# Patient Record
Sex: FEMALE | Race: BLACK OR AFRICAN AMERICAN
[De-identification: names, ages, dates, MRNs, and addresses within clinical notes are randomized per-mention and may not be internally consistent; named-entity substitution may affect disease eponyms.]

---

## 2018-05-03 ENCOUNTER — HOSPITAL ENCOUNTER (INPATIENT)
Dept: HOSPITAL 17 - NEPC | Age: 63
LOS: 6 days | Discharge: HOME | DRG: 78 | End: 2018-05-09
Attending: HOSPITALIST | Admitting: HOSPITALIST
Payer: SELF-PAY

## 2018-05-03 VITALS
DIASTOLIC BLOOD PRESSURE: 109 MMHG | SYSTOLIC BLOOD PRESSURE: 223 MMHG | OXYGEN SATURATION: 98 % | RESPIRATION RATE: 18 BRPM | HEART RATE: 82 BPM | TEMPERATURE: 97.6 F

## 2018-05-03 VITALS
OXYGEN SATURATION: 100 % | HEART RATE: 94 BPM | RESPIRATION RATE: 19 BRPM | DIASTOLIC BLOOD PRESSURE: 79 MMHG | SYSTOLIC BLOOD PRESSURE: 155 MMHG

## 2018-05-03 VITALS
OXYGEN SATURATION: 98 % | DIASTOLIC BLOOD PRESSURE: 106 MMHG | RESPIRATION RATE: 16 BRPM | SYSTOLIC BLOOD PRESSURE: 222 MMHG | HEART RATE: 80 BPM

## 2018-05-03 VITALS
HEART RATE: 100 BPM | SYSTOLIC BLOOD PRESSURE: 158 MMHG | RESPIRATION RATE: 16 BRPM | DIASTOLIC BLOOD PRESSURE: 83 MMHG | OXYGEN SATURATION: 97 %

## 2018-05-03 VITALS
HEART RATE: 76 BPM | RESPIRATION RATE: 24 BRPM | TEMPERATURE: 99.3 F | OXYGEN SATURATION: 100 % | DIASTOLIC BLOOD PRESSURE: 65 MMHG | SYSTOLIC BLOOD PRESSURE: 147 MMHG

## 2018-05-03 VITALS
RESPIRATION RATE: 18 BRPM | DIASTOLIC BLOOD PRESSURE: 72 MMHG | SYSTOLIC BLOOD PRESSURE: 160 MMHG | OXYGEN SATURATION: 98 % | HEART RATE: 74 BPM

## 2018-05-03 VITALS
HEART RATE: 90 BPM | DIASTOLIC BLOOD PRESSURE: 81 MMHG | RESPIRATION RATE: 16 BRPM | OXYGEN SATURATION: 97 % | SYSTOLIC BLOOD PRESSURE: 167 MMHG

## 2018-05-03 VITALS
SYSTOLIC BLOOD PRESSURE: 181 MMHG | RESPIRATION RATE: 15 BRPM | OXYGEN SATURATION: 99 % | HEART RATE: 98 BPM | DIASTOLIC BLOOD PRESSURE: 80 MMHG

## 2018-05-03 VITALS
TEMPERATURE: 99 F | OXYGEN SATURATION: 99 % | SYSTOLIC BLOOD PRESSURE: 154 MMHG | RESPIRATION RATE: 20 BRPM | DIASTOLIC BLOOD PRESSURE: 77 MMHG | HEART RATE: 91 BPM

## 2018-05-03 VITALS
RESPIRATION RATE: 16 BRPM | HEART RATE: 76 BPM | OXYGEN SATURATION: 98 % | SYSTOLIC BLOOD PRESSURE: 212 MMHG | DIASTOLIC BLOOD PRESSURE: 110 MMHG

## 2018-05-03 VITALS — OXYGEN SATURATION: 99 % | SYSTOLIC BLOOD PRESSURE: 209 MMHG | DIASTOLIC BLOOD PRESSURE: 101 MMHG

## 2018-05-03 VITALS
OXYGEN SATURATION: 98 % | HEART RATE: 86 BPM | SYSTOLIC BLOOD PRESSURE: 174 MMHG | DIASTOLIC BLOOD PRESSURE: 85 MMHG | RESPIRATION RATE: 16 BRPM

## 2018-05-03 VITALS
OXYGEN SATURATION: 98 % | RESPIRATION RATE: 16 BRPM | HEART RATE: 86 BPM | DIASTOLIC BLOOD PRESSURE: 83 MMHG | SYSTOLIC BLOOD PRESSURE: 164 MMHG

## 2018-05-03 VITALS
HEART RATE: 85 BPM | RESPIRATION RATE: 16 BRPM | DIASTOLIC BLOOD PRESSURE: 78 MMHG | SYSTOLIC BLOOD PRESSURE: 172 MMHG | OXYGEN SATURATION: 97 %

## 2018-05-03 VITALS
DIASTOLIC BLOOD PRESSURE: 78 MMHG | SYSTOLIC BLOOD PRESSURE: 172 MMHG | HEART RATE: 88 BPM | OXYGEN SATURATION: 97 % | RESPIRATION RATE: 16 BRPM

## 2018-05-03 VITALS — SYSTOLIC BLOOD PRESSURE: 221 MMHG | DIASTOLIC BLOOD PRESSURE: 105 MMHG | OXYGEN SATURATION: 99 %

## 2018-05-03 VITALS
HEART RATE: 83 BPM | DIASTOLIC BLOOD PRESSURE: 65 MMHG | OXYGEN SATURATION: 99 % | SYSTOLIC BLOOD PRESSURE: 150 MMHG | RESPIRATION RATE: 19 BRPM

## 2018-05-03 VITALS
RESPIRATION RATE: 21 BRPM | OXYGEN SATURATION: 99 % | HEART RATE: 73 BPM | SYSTOLIC BLOOD PRESSURE: 156 MMHG | DIASTOLIC BLOOD PRESSURE: 70 MMHG

## 2018-05-03 VITALS — WEIGHT: 220.68 LBS | BODY MASS INDEX: 44.49 KG/M2 | HEIGHT: 59 IN

## 2018-05-03 VITALS
DIASTOLIC BLOOD PRESSURE: 70 MMHG | HEART RATE: 78 BPM | SYSTOLIC BLOOD PRESSURE: 151 MMHG | OXYGEN SATURATION: 97 % | RESPIRATION RATE: 20 BRPM

## 2018-05-03 VITALS — OXYGEN SATURATION: 98 %

## 2018-05-03 VITALS
HEART RATE: 84 BPM | DIASTOLIC BLOOD PRESSURE: 95 MMHG | SYSTOLIC BLOOD PRESSURE: 202 MMHG | RESPIRATION RATE: 16 BRPM | OXYGEN SATURATION: 98 %

## 2018-05-03 VITALS — OXYGEN SATURATION: 99 %

## 2018-05-03 DIAGNOSIS — I16.0: ICD-10-CM

## 2018-05-03 DIAGNOSIS — R11.0: ICD-10-CM

## 2018-05-03 DIAGNOSIS — I10: ICD-10-CM

## 2018-05-03 DIAGNOSIS — R47.01: ICD-10-CM

## 2018-05-03 DIAGNOSIS — I67.4: Primary | ICD-10-CM

## 2018-05-03 DIAGNOSIS — K64.8: ICD-10-CM

## 2018-05-03 DIAGNOSIS — R30.0: ICD-10-CM

## 2018-05-03 DIAGNOSIS — G45.9: ICD-10-CM

## 2018-05-03 DIAGNOSIS — J45.909: ICD-10-CM

## 2018-05-03 DIAGNOSIS — K51.90: ICD-10-CM

## 2018-05-03 DIAGNOSIS — K64.4: ICD-10-CM

## 2018-05-03 LAB
ALBUMIN SERPL-MCNC: 3.1 GM/DL (ref 3.4–5)
ALP SERPL-CCNC: 72 U/L (ref 45–117)
ALT SERPL-CCNC: 10 U/L (ref 10–53)
AST SERPL-CCNC: 13 U/L (ref 15–37)
BASOPHILS # BLD AUTO: 0 TH/MM3 (ref 0–0.2)
BASOPHILS NFR BLD: 0.6 % (ref 0–2)
BILIRUB SERPL-MCNC: 0.3 MG/DL (ref 0.2–1)
BUN SERPL-MCNC: 8 MG/DL (ref 7–18)
CALCIUM SERPL-MCNC: 8.7 MG/DL (ref 8.5–10.1)
CHLORIDE SERPL-SCNC: 108 MEQ/L (ref 98–107)
CREAT SERPL-MCNC: 1.08 MG/DL (ref 0.5–1)
EOSINOPHIL # BLD: 0.2 TH/MM3 (ref 0–0.4)
EOSINOPHIL NFR BLD: 2.3 % (ref 0–4)
ERYTHROCYTE [DISTWIDTH] IN BLOOD BY AUTOMATED COUNT: 16.6 % (ref 11.6–17.2)
GFR SERPLBLD BASED ON 1.73 SQ M-ARVRAT: 62 ML/MIN (ref 89–?)
GLUCOSE SERPL-MCNC: 117 MG/DL (ref 74–106)
HBA1C MFR BLD: 5.4 % (ref 4.3–6)
HCO3 BLD-SCNC: 26.5 MEQ/L (ref 21–32)
HCT VFR BLD CALC: 39 % (ref 35–46)
HGB BLD-MCNC: 12.8 GM/DL (ref 11.6–15.3)
LYMPHOCYTES # BLD AUTO: 2.1 TH/MM3 (ref 1–4.8)
LYMPHOCYTES NFR BLD AUTO: 31 % (ref 9–44)
MCH RBC QN AUTO: 27.9 PG (ref 27–34)
MCHC RBC AUTO-ENTMCNC: 32.9 % (ref 32–36)
MCV RBC AUTO: 84.9 FL (ref 80–100)
MONOCYTE #: 0.7 TH/MM3 (ref 0–0.9)
MONOCYTES NFR BLD: 10.2 % (ref 0–8)
NEUTROPHILS # BLD AUTO: 3.7 TH/MM3 (ref 1.8–7.7)
NEUTROPHILS NFR BLD AUTO: 55.9 % (ref 16–70)
PLATELET # BLD: 422 TH/MM3 (ref 150–450)
PMV BLD AUTO: 7.4 FL (ref 7–11)
PROT SERPL-MCNC: 8.2 GM/DL (ref 6.4–8.2)
RBC # BLD AUTO: 4.6 MIL/MM3 (ref 4–5.3)
SODIUM SERPL-SCNC: 142 MEQ/L (ref 136–145)
TROPONIN I SERPL-MCNC: (no result) NG/ML (ref 0.02–0.05)
WBC # BLD AUTO: 6.6 TH/MM3 (ref 4–11)

## 2018-05-03 PROCEDURE — 93306 TTE W/DOPPLER COMPLETE: CPT

## 2018-05-03 PROCEDURE — 94150 VITAL CAPACITY TEST: CPT

## 2018-05-03 PROCEDURE — 84484 ASSAY OF TROPONIN QUANT: CPT

## 2018-05-03 PROCEDURE — 87506 IADNA-DNA/RNA PROBE TQ 6-11: CPT

## 2018-05-03 PROCEDURE — 87205 SMEAR GRAM STAIN: CPT

## 2018-05-03 PROCEDURE — 81001 URINALYSIS AUTO W/SCOPE: CPT

## 2018-05-03 PROCEDURE — 93880 EXTRACRANIAL BILAT STUDY: CPT

## 2018-05-03 PROCEDURE — 96374 THER/PROPH/DIAG INJ IV PUSH: CPT

## 2018-05-03 PROCEDURE — 87102 FUNGUS ISOLATION CULTURE: CPT

## 2018-05-03 PROCEDURE — 87015 SPECIMEN INFECT AGNT CONCNTJ: CPT

## 2018-05-03 PROCEDURE — 70551 MRI BRAIN STEM W/O DYE: CPT

## 2018-05-03 PROCEDURE — 80053 COMPREHEN METABOLIC PANEL: CPT

## 2018-05-03 PROCEDURE — 87328 CRYPTOSPORIDIUM AG IA: CPT

## 2018-05-03 PROCEDURE — 85025 COMPLETE CBC W/AUTO DIFF WBC: CPT

## 2018-05-03 PROCEDURE — 86703 HIV-1/HIV-2 1 RESULT ANTBDY: CPT

## 2018-05-03 PROCEDURE — 82948 REAGENT STRIP/BLOOD GLUCOSE: CPT

## 2018-05-03 PROCEDURE — 87329 GIARDIA AG IA: CPT

## 2018-05-03 PROCEDURE — 93005 ELECTROCARDIOGRAM TRACING: CPT

## 2018-05-03 PROCEDURE — 76937 US GUIDE VASCULAR ACCESS: CPT

## 2018-05-03 PROCEDURE — 84132 ASSAY OF SERUM POTASSIUM: CPT

## 2018-05-03 PROCEDURE — 84100 ASSAY OF PHOSPHORUS: CPT

## 2018-05-03 PROCEDURE — 87206 SMEAR FLUORESCENT/ACID STAI: CPT

## 2018-05-03 PROCEDURE — 87641 MR-STAPH DNA AMP PROBE: CPT

## 2018-05-03 PROCEDURE — 70544 MR ANGIOGRAPHY HEAD W/O DYE: CPT

## 2018-05-03 PROCEDURE — 80061 LIPID PANEL: CPT

## 2018-05-03 PROCEDURE — 88305 TISSUE EXAM BY PATHOLOGIST: CPT

## 2018-05-03 PROCEDURE — 83036 HEMOGLOBIN GLYCOSYLATED A1C: CPT

## 2018-05-03 PROCEDURE — 70450 CT HEAD/BRAIN W/O DYE: CPT

## 2018-05-03 PROCEDURE — 80048 BASIC METABOLIC PNL TOTAL CA: CPT

## 2018-05-03 PROCEDURE — 82272 OCCULT BLD FECES 1-3 TESTS: CPT

## 2018-05-03 RX ADMIN — INSULIN ASPART SCH: 100 INJECTION, SOLUTION INTRAVENOUS; SUBCUTANEOUS at 12:00

## 2018-05-03 RX ADMIN — Medication SCH ML: at 21:52

## 2018-05-03 RX ADMIN — STANDARDIZED SENNA CONCENTRATE AND DOCUSATE SODIUM SCH TAB: 8.6; 5 TABLET, FILM COATED ORAL at 21:00

## 2018-05-03 RX ADMIN — NICARDIPINE HYDROCHLORIDE PRN MLS/HR: 2.5 INJECTION INTRAVENOUS at 21:53

## 2018-05-03 RX ADMIN — DILTIAZEM HYDROCHLORIDE SCH MG: 60 TABLET, FILM COATED ORAL at 17:03

## 2018-05-03 RX ADMIN — HEPARIN SODIUM SCH UNITS: 10000 INJECTION, SOLUTION INTRAVENOUS; SUBCUTANEOUS at 14:20

## 2018-05-03 RX ADMIN — DILTIAZEM HYDROCHLORIDE SCH MG: 60 TABLET, FILM COATED ORAL at 23:29

## 2018-05-03 RX ADMIN — ASPIRIN SCH MG: 325 TABLET ORAL at 12:00

## 2018-05-03 RX ADMIN — INSULIN ASPART SCH: 100 INJECTION, SOLUTION INTRAVENOUS; SUBCUTANEOUS at 21:00

## 2018-05-03 RX ADMIN — PHENYTOIN SODIUM SCH MLS/HR: 50 INJECTION INTRAMUSCULAR; INTRAVENOUS at 19:00

## 2018-05-03 RX ADMIN — FAMOTIDINE SCH MG: 10 INJECTION, SOLUTION INTRAVENOUS at 21:52

## 2018-05-03 RX ADMIN — INSULIN ASPART SCH: 100 INJECTION, SOLUTION INTRAVENOUS; SUBCUTANEOUS at 17:00

## 2018-05-03 NOTE — HHI.HP
HPI


Service


Critical Care Medicine


Primary Care Physician


Unknown


Admission Diagnosis





hypertensive encephalopathy


Diagnosis:  


Travel History


International Travel<30 Days:  No


Contact w/Intl Traveler <30 Da:  No


Traveled to Known Affected Are:  No


History of Present Illness


History of Present Illness


HPI


This is a 63-year-old female that presented to  the [facility] ED with complaints 

of headache and some confusion.  Per report beginning last evening ,she had 

pain behind her left eye feeling like someone is pushing her eye out, constant, 

moderate severity associated with nausea but no vomiting.  For about an hour 

last evening she says she was confused and her words were not coming out right.

  Her daughter said she had slurred speech and she did not make any sense.  The 

patient's daughter directed her to report to the hospital last night , however 

she was reluctant secondary to lack of insurance.  This morning the patient's 

symptoms worsened, and she came to the ED.  She recently was diagnosed with 

hypertension and she was started on valsartan which she took last night at 6 

PM.  She also took Tylenol throughout the night for her pain.  In the ED , the 

patient was noted to be hypertensive nicardipine infusion was initiated and 

initial CT scan was performed which was negative.  Intermittently during the 

visit the patient began to have expressive aphasia and confusion, and a stroke 

alert was called.  Dr. Dyer evaluated the patient, repeat CT was performed, 

TPA was initially ordered however cancelled as the patient had resolution of 

symptoms returned to baseline.  Critical care medicine was consulted.





 History 


PFSH


Past Medical History


Hx Anticoagulant Therapy:  No


Asthma:  Yes


Diabetes:  No


Diminished Hearing:  No


GERD:  Yes


Hypertension:  Yes


:  2


Para:  2





Past Surgical History


Tonsillectomy:  Yes





Social History


Alcohol Use:  No


Tobacco Use:  No


Substance Use:  No





 Allergies-Medications 


Allergies-Medications


(Allergen,Severity, Reaction):  


Coded Allergies:  


     iodine (Unverified  Allergy, Severe, Hives, 8/15/17)


     potassium iodide (Unverified  Allergy, Severe, Hives, 8/15/17)


     povidone-iodine (Unverified  Allergy, Severe, Hives, 8/15/17)


     sodium iodide (Unverified  Allergy, Severe, Hives, 8/15/17)


     sodium iodide (Unverified  Allergy, Severe, Hives, 8/15/17)


Reported Meds & Prescriptions





Reported Meds & Active Scripts


Active


Erythromycin Opht 0.5% Oint (Erythromycin) 0.5 % Oint 1 Applic RIGHT EYE Q6HR 5 

Days





Review of Systems


ROS


12 point review of systems done with the patient negative except for pertinent 

positives mentioned in the above history and physical.





Physical Exam


Vital Signs





Vital Signs








  Date Time  Temp Pulse Resp B/P (MAP) Pulse Ox O2 Delivery O2 Flow Rate FiO2


 


5/3/18 13:47  90 16 167/81 (109) 97 Nasal Cannula 2.00 


 


5/3/18 13:12  100 16 158/83 (108) 97 Nasal Cannula 2.00 


 


5/3/18 12:08  86 16 164/83 (110) 98 Nasal Cannula 2.00 


 


5/3/18 12:05     98  2.00 


 


5/3/18 11:39  86 16 174/85 (114) 98 Nasal Cannula 2.00 


 


5/3/18 11:30  84 16 202/95 (130) 98 Nasal Cannula 2.00 


 


5/3/18 11:19  76 16 212/110 (144) 98 Nasal Cannula 2.00 


 


5/3/18 11:16  80 16 222/106 (144) 98 Nasal Cannula 2.00 


 


5/3/18 11:05  76 16 221/105 (143) 99 Nasal Cannula 2.00 


 


5/3/18 11:05     98 Nasal Cannula 2.00 


 


5/3/18 10:34  77 16 209/101 (137) 99 Nasal Cannula 2.00 


 


5/3/18 09:50  70 16  98 Room Air  


 


5/3/18 09:24 97.6 82 18 223/109 (147) 98   








Laboratory





Laboratory Tests








Test


  5/3/18


09:58 5/3/18


10:30


 


White Blood Count 6.6  


 


Red Blood Count 4.60  


 


Hemoglobin 12.8  


 


Hematocrit 39.0  


 


Mean Corpuscular Volume 84.9  


 


Mean Corpuscular Hemoglobin 27.9  


 


Mean Corpuscular Hemoglobin


Concent 32.9 


  


 


 


Red Cell Distribution Width 16.6  


 


Platelet Count 422  


 


Mean Platelet Volume 7.4  


 


Neutrophils (%) (Auto) 55.9  


 


Lymphocytes (%) (Auto) 31.0  


 


Monocytes (%) (Auto) 10.2  


 


Eosinophils (%) (Auto) 2.3  


 


Basophils (%) (Auto) 0.6  


 


Neutrophils # (Auto) 3.7  


 


Lymphocytes # (Auto) 2.1  


 


Monocytes # (Auto) 0.7  


 


Eosinophils # (Auto) 0.2  


 


Basophils # (Auto) 0.0  


 


CBC Comment DIFF FINAL  


 


Differential Comment   


 


Blood Urea Nitrogen  8 


 


Creatinine  1.08 


 


Random Glucose  117 


 


Total Protein  8.2 


 


Albumin  3.1 


 


Calcium Level  8.7 


 


Alkaline Phosphatase  72 


 


Aspartate Amino Transf


(AST/SGOT) 


  13 


 


 


Alanine Aminotransferase


(ALT/SGPT) 


  10 


 


 


Total Bilirubin  0.3 


 


Sodium Level  142 


 


Potassium Level  3.1 


 


Chloride Level  108 


 


Carbon Dioxide Level  26.5 


 


Anion Gap  8 


 


Estimat Glomerular Filtration


Rate 


  62 


 


 


Troponin I  LESS THAN 0.02 








Result Diagram:  


5/3/18 0958                                                                    

            5/3/18 1030





Imaging





Last Impressions








Head CT 5/3/18 0947 Signed





Impressions: 





 Service Date/Time:  Thursday, May 3, 2018 10:03 - CONCLUSION:  Negative 





 noncontrast CT brain.     Rosalio Carter MD 


 


Carotid Artery Ultrasound 5/3/18 0000 Signed





Impressions: 





 Service Date/Time:  Thursday, May 3, 2018 12:05 - CONCLUSION: Minimal plaque 





 with no evidence of stenosis.     Antoni Horne MD 











Septic Shock Reassessment


Septic shock perfusion:  reassessment completed





Caprini VTE Risk Assessment


Caprini VTE Risk Assessment:  Mod/High Risk (score >= 2)


Caprini Risk Assessment Model











 Point Value = 1          Point Value = 2  Point Value = 3  Point Value = 5


 


Age 41-60


Minor surgery


BMI > 25 kg/m2


Swollen legs


Varicose veins


Pregnancy or postpartum


History of unexplained or recurrent


   spontaneous 


Oral contraceptives or hormone


   replacement


Sepsis (< 1 month)


Serious lung disease, including


   pneumonia (< 1 month)


Abnormal pulmonary function


Acute myocardial infarction


Congestive heart failure (< 1 month)


History of inflammatory bowel disease


Medical patient at bed rest Age 61-74


Arthroscopic surgery


Major open surgery (> 45 min)


Laparoscopic surgery (> 45 min)


Malignancy


Confined to bed (> 72 hours)


Immobilizing plaster cast


Central venous access Age >= 75


History of VTE


Family history of VTE


Factor V Leiden


Prothrombin 34150I


Lupus anticoagulant


Anticardiolipin antibodies


Elevated serum homocysteine


Heparin-induced thrombocytopenia


Other congenital or acquired


   thrombophilia Stroke (< 1 month)


Elective arthroplasty


Hip, pelvis, or leg fracture


Acute spinal cord injury (< 1 month)








Prophylaxis Regimen











   Total Risk


Factor Score Risk Level Prophylaxis Regimen


 


0-1      Low Early ambulation


 


2 Moderate Order ONE of the following:


*Sequential Compression Device (SCD)


*Heparin 5000 units SQ BID


 


3-4 Higher Order ONE of the following medications:


*Heparin 5000 units SQ TID


*Enoxaparin/Lovenox 40 mg SQ daily (WT < 150 kg, CrCl > 30 mL/min)


*Enoxaparin/Lovenox 30 mg SQ daily (WT < 150 kg, CrCl > 10-29 mL/min)


*Enoxaparin/Lovenox 30 mg SQ BID (WT < 150 kg, CrCl > 30 mL/min)


AND/OR


*Sequential Compression Device (SCD)


 


5 or more Highest Order ONE of the following medications:


*Heparin 5000 units SQ TID (Preferred with Epidurals)


*Enoxaparin/Lovenox 40 mg SQ daily (WT < 150 kg, CrCl > 30 mL/min)


*Enoxaparin/Lovenox 30 mg SQ daily (WT < 150 kg, CrCl > 10-29 mL/min)


*Enoxaparin/Lovenox 30 mg SQ BID (WT < 150 kg, CrCl > 30 mL/min)


AND


*Sequential Compression Device (SCD)











Assessment and Plan


Problem List:  


(1) Hypertensive encephalopathy


ICD Code:  I67.4 - Hypertensive encephalopathy


Status:  Acute


(2) TIA (transient ischemic attack)


ICD Code:  G45.9 - Transient cerebral ischemic attack, unspecified


(3) GERD (gastroesophageal reflux disease)


ICD Code:  K21.9 - Gastro-esophageal reflux disease without esophagitis


(4) Hypertensive urgency


ICD Code:  I16.0 - Hypertensive urgency


Assessment and Plan


Assessment


63-year-old -American female with a history of progressive TIAs per 

report currently with hypertension  on nicardipine infusion. Admit to ICU


Plan by systems: 


Neurologic:


TIA


Hypertensive encephalopathy


Neurology following-Dr. Dyer


Follow-up MRI/MRA brain


Follow-up carotid Doppler study


5/3 Initial CT brain negative


5/3 Repeat CT-1. Stable noncontrast head CT. No acute intracranial abnormality 

is identified. Stable mild periventricular white matter low-attenuation likely 

representing chronic microvascular ischemia. There is also an asymmetric area 

of low density in the right centrum semiovale which may represent an area of 

old ischemia.


Maintain head of bed flat


 mg/day


 





Respiratory:


Maintain O2 saturation greater than 92%


Incentive spirometry


Duo nebs every 4 hours as needed for wheezing





Cardiovascular:


Hypertensive Urgency


Nicardipine 5 mg/hour-continue to wean


Cardizem 60 mg p.o. every 8 hrs


Obtain echo


Hydralazine 20 mg every 4 hours for SBP greater than 160mmHg


Labetalol 10 mg every 4 hours as needed for SBP greater than 180mmHg





Renal:


No De [place] Garza indicated


-- Strict I/Os 





FEN/GI:


Monitor BMP


Obtain lipid panel


Obtain formal swallow study


Normal saline 84 cc/hr


Zofran for nausea 


Famotidine GI prophylaxis





Heme/ID:


Monitor CBC





Endocrine:


Glucose monitoring per ICU protocol


Follow-up hemoglobin A1c


-- SSI 





Prophylaxis:


GI Prophylaxis


Famotidine twice daily


DVT Prophylaxis


-- SCDs


Heparin 5000 SQ BID





Lines:


Peripheral IVs 2.  Central line if indicated


Dispo:


my billing statement 


This patient remains critically ill with one or more organ systems which are or 

may become a threat to life. I have spent in excess of 30 minutes 

discontinuously in the care and management of this patient. This time is 

exclusive of procedures, and includes, but is not limited to, evaluation of the 

patient, review of the medical record, discussions with family, consultants, 

nursing staff, or respiratory therapy, and documentation in the medical record.


Code Status


Full


Discussed Condition With


Daughter MsMatthew Dr. Kraig Downing and ED RN at bedside.











Tomasa Taylor MD May 3, 2018 14:23

## 2018-05-03 NOTE — RADRPT
EXAM DATE/TIME:  05/03/2018 13:13 

 

[facility] COMPARISON:     

CT BRAIN W/O CONTRAST, May 03, 2018, 11:16.

       

 

 

INDICATIONS :     

Altered mental status. CVA.

                     

 

MEDICAL HISTORY :     

Hypertension.     

 

SURGICAL HISTORY :     

None.     

 

ENCOUNTER:     

Subsequent

 

ACUITY:     

1 day

 

PAIN SCORE:     

0/10

 

LOCATION:         

head.

 

TECHNIQUE:     

Multiplanar, multisequence MRI of the brain was performed without contrast.

 

FINDINGS:     

 

CEREBRUM:     

The ventricles are normal for age.  No evidence of midline shift, mass lesion, hemorrhage or acute in
farction.  No extraaxial fluid collections are seen.  The pituitary gland and suprasellar cistern are
 normal in configuration.

 

WHITE MATTER:     

Scattered areas of periventricular T2 prolongation and a focal area of T2 prolongation in the mid bod
y of the corpus callosum characteristic of an old infarction.

 

POSTERIOR FOSSA:     

The cerebellum and brainstem are intact.  The 4th ventricle is midline. The cerebellopontine angle is
 unremarkable.  The cerebellar tonsils are normal in position.

 

DIFFUSION IMAGING:     

No focal areas of restricted diffusion are seen.  No evidence of acute infarction.

 

EXTRACRANIAL:     

The visualized portions of the orbits and paranasal sinuses are unremarkable.  8 mm cyst in the right
 parotid.

 

CONCLUSION:     

1. No acute findings in the brain.

2. Nonspecific areas of T2 prolongation in the periventricular white matter and involving the mid-cor
pus callosum.

 

 

 

 Rosalio Carter MD on May 03, 2018 at 13:59           

Board Certified Radiologist.

 This report was verified electronically.

## 2018-05-03 NOTE — RADRPT
EXAM DATE/TIME:  05/03/2018 11:16 

 

[facility] COMPARISON:     

CT BRAIN W/O CONTRAST, May 03, 2018, 10:03.

 

 

INDICATIONS :     

Stroke alert.Confusion

                      

 

RADIATION DOSE:     

35.87 CTDIvol (mGy) 

 

 

These findings were directly reported to Dr. Cornell by Dr. Rodriguez at 11:

20 AM. 

 

 

MEDICAL HISTORY :     

Hypertension.  

 

SURGICAL HISTORY :      

None. 

 

ENCOUNTER:      

Initial

 

ACUITY:      

1 day

 

PAIN SCALE:      

5/10

 

LOCATION:       

Left  eye

 

TECHNIQUE:     

Multiple contiguous axial images were obtained of the head.  Using automated exposure control and adj
ustment of the mA and/or kV according to patient size, radiation dose was kept as low as reasonably a
chievable to obtain optimal diagnostic quality images.   DICOM format image data is available electro
nically for review and comparison.  

 

FINDINGS:     

 

CEREBRUM:     

The ventricles are normal. There is stable mild periventricular white matter low-attenuation and is s
table area of low density in the right centrum semiovale measuring 11 mm. There is mild basal ganglia
 calcification bilaterally.  No midline shift, mass lesion, hemorrhage or acute infarction.  No extra
-axial fluid collections are seen.

 

POSTERIOR FOSSA:     

The cerebellum and brainstem demonstrate no acute finding.  The 4th ventricle is midline.  The cerebe
llopontine angle is unremarkable.

 

EXTRACRANIAL:     

Visualized sinuses are clear.

 

SKULL:     

The calvaria is intact.  No evidence of skull fracture.

 

CONCLUSION:     

1. Stable noncontrast head CT. No acute intracranial abnormality is identified.

2. Stable mild periventricular white matter low-attenuation likely representing chronic microvascular
 ischemia. There is also an asymmetric area of low density in the right centrum semiovale which may r
epresent an area of old ischemia.

 

 

 

 Joce Rodriguez MD on May 03, 2018 at 11:21           

Board Certified Radiologist.

 This report was verified electronically.

## 2018-05-03 NOTE — RADRPT
EXAM DATE/TIME:  05/03/2018 13:13 

 

[facility] COMPARISON:     

No previous studies available for comparison.

       

 

 

INDICATIONS :     

Altered mental status. CVA.

                     

 

MEDICAL HISTORY :     

Hypertension.     

 

SURGICAL HISTORY :     

None.     

 

ENCOUNTER:     

Subsequent

 

ACUITY:     

1 day

 

PAIN SCORE:     

0/10

 

LOCATION:         

head.

 

Please note a normal MRA of the brain does not entirely exclude the possibility of a small aneurysm, 


nor the possibility of distal intracranial vessel disease.

 

TECHNIQUE:     

3D time of flight MRA was performed.  Source images, multiplanar STS MIP, and 3D volume MIP reconstru
ctions were reviewed.

 

FINDINGS:     

There is excellent visualization of the major intracranial arteries out to the second-order branch ve
ssels.  There is no evidence for aneurysm, vessel truncation or stenosis, and no evidence for vascula
r malformation.  The right posterior cerebral artery arises from the anterior circulation.  Flow is s
een in the left PCOM and in the anterior communicating artery.

 

CONCLUSION:     

No evidence of vessel truncation.

 

 

 

 Rosalio Carter MD on May 03, 2018 at 14:02           

Board Certified Radiologist.

 This report was verified electronically.

## 2018-05-03 NOTE — RADRPT
EXAM DATE/TIME:  05/03/2018 12:05 

 

[facility] COMPARISON:     

No previous studies available for comparison.

        

 

 

INDICATIONS :     

Headache. Confusion.  Pain behind left eye.

                     

 

MEDICAL HISTORY :     

Hypertension. Gastroesophageal reflux disease.   Asthma.

 

SURGICAL HISTORY :     

Tonsillectomy.     

 

ENCOUNTER:     

Initial

 

ACUITY:     

2 days

 

PAIN SCORE:     

3/10

 

LOCATION:     

Bilateral neck 

                     

PEAK SYSTOLIC VELOCITIES (cm/sec):

 

ICA/CCA RATIO:                    

Right: 1.0     Left: 0.5

 

ICA:                          

Right: 106     Left: 63

 

CCA:                          

Right: 106     Left: 140

 

ECA:                           

Right: 87     Left: 74

 

VERTEBRAL:           

Right: 48 antegrade     Left: 68 antegrade

             

Elevated flow velocities and ICA/CCA ratios have been found to correlate with increased degrees of

vessel stenosis, calculated as percentage of diameter relative to a normal segment of distal ICA/CCA

 

FINDINGS:     

 

RIGHT CAROTID:     

No significant stenosis is visualized. Minimal plaque is present. The waveforms are within normal gunter
its.

 

LEFT CAROTID:     

No significant stenosis is visualized.  Minimal plaque is present. The waveforms are within normal li
mits.

 

VERTEBRAL ARTERIES:  

Antegrade flow is seen in both vertebral arteries.

MISCELLANEOUS:     None.

 

CONCLUSION:     Minimal plaque with no evidence of stenosis. 

 

 

 Antoni Horne MD on May 03, 2018 at 13:01           

Board Certified Radiologist.

 This report was verified electronically.

## 2018-05-03 NOTE — MB
cc:

Shahid Dyer MD, PhD

****

 

 

DATE:

05/03/2018

 

REASON FOR CONSULTATION:

Stroke alert.

 

REFERRING PHYSICIAN:

Susi Cornell MD

 

HISTORY OF PRESENT ILLNESS:

Ms. Bonilla is a pleasant 63-year-old female who presented to the ER 

for headache.  While in the ER, developed sudden difficulty getting 

words out with an expressive aphasia.  No focal motor symptoms were 

observed.  Therefore, a stroke alert was called.  She had a similar 

episode last night.  Her daughter relates over the past several 

months, she has had 4 episodes.  She does have headaches.  No history 

of migraine headaches.

 

PAST MEDICAL HISTORY:

Hypertension.

 

MEDICATIONS:

She does not take any anticoagulants, does not take an aspirin a day.

 

NEUROLOGIC EXAMINATION:

VITAL SIGNS:  Blood pressure initially 223/109.  She is now on 

Cardene, pressure 174/85, pulse 86, regular, respirations 16, 

temperature 97.6 degrees.

HIGHER CORTICAL FUNCTION:  She is alert, oriented x 3.  Her speech is 

normal at the present time.  It is now fluent.  She feels back to 

normal.  She follows commands well.

CRANIAL NERVES:  Intact.

MOTOR:  5/5 strength of all groups.  There is no drift. _____ normal. 

Reflexes are symmetric.

 

DIAGNOSTIC DATA:

CT of the brain is negative.  CTA:  She cannot have a CT angiogram due

to ALLERGY TO CONTRAST MEDIA.

 

LABORATORY DATA:

The white count 6600, hemoglobin 12.8, hematocrit 39%, platelet count 

422,000.  Sodium is 142, potassium 3.1, chloride 100, CO2 of 26.5, BUN

8, creatinine 1.08, GFR 62, glucose 117, AST 13, ALT 10.

 

Her telemetry sinus rhythm.

 

IMPRESSION:

Probable transient ischemic attack with expressive aphasia, now 

resolved.  Therefore, would not recommend tissue plasminogen 

activator.  We will proceed with an MRI and MRA of the brain.  Start 

aspirin 325 mg daily.  We will keep her bedrest, head of bed flat.  

Evaluate her further with carotid ultrasound and echocardiogram.  

Monitor cardiac telemetry, rule out atrial fibrillation.  Also, check 

a lipid panel.

 

Thank you for allowing us to see this nice patient in consultation.

 

 

__________________________________

Shahid Dyer MD, PhD

 

 

YANICK/SB

D: 05/03/2018, 11:47 AM

T: 05/03/2018, 12:07 PM

Visit #: [id number]

Job #: [id number]

## 2018-05-03 NOTE — RADRPT
EXAM DATE/TIME:  05/03/2018 10:03 

 

[facility] COMPARISON:     

No previous studies available for comparison.

 

 

INDICATIONS :     

Left eye pain with no trauma

                      

 

RADIATION DOSE:     

36.80 CTDIvol (mGy) 

 

 

 

MEDICAL HISTORY :     

Hypertension.  

 

SURGICAL HISTORY :      

None. 

 

ENCOUNTER:      

Initial

 

ACUITY:      

1 day

 

PAIN SCALE:      

5/10

 

LOCATION:       

Left  eye

 

TECHNIQUE:     

Multiple contiguous axial images were obtained of the head.  Using automated exposure control and adj
ustment of the mA and/or kV according to patient size, radiation dose was kept as low as reasonably a
chievable to obtain optimal diagnostic quality images.   DICOM format image data is available electro
nically for review and comparison.  

 

FINDINGS:     

 

CEREBRUM:     

The ventricles are normal for age.  No evidence of midline shift, mass lesion, hemorrhage or acute in
farction.  No extra-axial fluid collections are seen.

 

POSTERIOR FOSSA:     

The cerebellum and brainstem are intact.  The 4th ventricle is midline.  The cerebellopontine angle i
s unremarkable.

 

EXTRACRANIAL:     

The visualized portion of the orbits is intact.

 

SKULL:     

The calvaria is intact.  No evidence of skull fracture.

 

CONCLUSION:     

Negative noncontrast CT brain.

 

 

 

 Rosalio Carter MD on May 03, 2018 at 10:08           

Board Certified Radiologist.

 This report was verified electronically.

## 2018-05-04 VITALS
OXYGEN SATURATION: 100 % | RESPIRATION RATE: 24 BRPM | SYSTOLIC BLOOD PRESSURE: 116 MMHG | HEART RATE: 69 BPM | DIASTOLIC BLOOD PRESSURE: 60 MMHG

## 2018-05-04 VITALS
TEMPERATURE: 98.5 F | OXYGEN SATURATION: 99 % | RESPIRATION RATE: 28 BRPM | HEART RATE: 64 BPM | DIASTOLIC BLOOD PRESSURE: 62 MMHG | SYSTOLIC BLOOD PRESSURE: 108 MMHG

## 2018-05-04 VITALS
SYSTOLIC BLOOD PRESSURE: 125 MMHG | TEMPERATURE: 97.9 F | RESPIRATION RATE: 15 BRPM | DIASTOLIC BLOOD PRESSURE: 63 MMHG | HEART RATE: 60 BPM | OXYGEN SATURATION: 96 %

## 2018-05-04 VITALS
SYSTOLIC BLOOD PRESSURE: 124 MMHG | HEART RATE: 65 BPM | OXYGEN SATURATION: 98 % | RESPIRATION RATE: 24 BRPM | DIASTOLIC BLOOD PRESSURE: 60 MMHG

## 2018-05-04 VITALS
OXYGEN SATURATION: 99 % | HEART RATE: 78 BPM | RESPIRATION RATE: 21 BRPM | TEMPERATURE: 99 F | SYSTOLIC BLOOD PRESSURE: 159 MMHG | DIASTOLIC BLOOD PRESSURE: 72 MMHG

## 2018-05-04 VITALS
DIASTOLIC BLOOD PRESSURE: 73 MMHG | RESPIRATION RATE: 20 BRPM | SYSTOLIC BLOOD PRESSURE: 143 MMHG | OXYGEN SATURATION: 99 % | HEART RATE: 77 BPM

## 2018-05-04 VITALS
OXYGEN SATURATION: 100 % | DIASTOLIC BLOOD PRESSURE: 67 MMHG | RESPIRATION RATE: 24 BRPM | HEART RATE: 76 BPM | SYSTOLIC BLOOD PRESSURE: 137 MMHG

## 2018-05-04 VITALS
RESPIRATION RATE: 27 BRPM | HEART RATE: 79 BPM | OXYGEN SATURATION: 96 % | SYSTOLIC BLOOD PRESSURE: 139 MMHG | DIASTOLIC BLOOD PRESSURE: 65 MMHG

## 2018-05-04 VITALS
OXYGEN SATURATION: 99 % | HEART RATE: 68 BPM | DIASTOLIC BLOOD PRESSURE: 73 MMHG | RESPIRATION RATE: 16 BRPM | SYSTOLIC BLOOD PRESSURE: 150 MMHG

## 2018-05-04 VITALS
RESPIRATION RATE: 17 BRPM | HEART RATE: 65 BPM | DIASTOLIC BLOOD PRESSURE: 58 MMHG | OXYGEN SATURATION: 99 % | SYSTOLIC BLOOD PRESSURE: 126 MMHG

## 2018-05-04 VITALS
TEMPERATURE: 98.9 F | SYSTOLIC BLOOD PRESSURE: 120 MMHG | OXYGEN SATURATION: 98 % | DIASTOLIC BLOOD PRESSURE: 58 MMHG | HEART RATE: 61 BPM | RESPIRATION RATE: 16 BRPM

## 2018-05-04 VITALS
DIASTOLIC BLOOD PRESSURE: 79 MMHG | HEART RATE: 83 BPM | TEMPERATURE: 98.6 F | OXYGEN SATURATION: 100 % | RESPIRATION RATE: 31 BRPM | SYSTOLIC BLOOD PRESSURE: 179 MMHG

## 2018-05-04 VITALS
RESPIRATION RATE: 31 BRPM | DIASTOLIC BLOOD PRESSURE: 73 MMHG | HEART RATE: 65 BPM | SYSTOLIC BLOOD PRESSURE: 143 MMHG | OXYGEN SATURATION: 97 %

## 2018-05-04 VITALS
HEART RATE: 77 BPM | SYSTOLIC BLOOD PRESSURE: 166 MMHG | DIASTOLIC BLOOD PRESSURE: 78 MMHG | RESPIRATION RATE: 31 BRPM | OXYGEN SATURATION: 100 %

## 2018-05-04 VITALS
DIASTOLIC BLOOD PRESSURE: 62 MMHG | SYSTOLIC BLOOD PRESSURE: 108 MMHG | RESPIRATION RATE: 19 BRPM | OXYGEN SATURATION: 99 % | HEART RATE: 64 BPM | TEMPERATURE: 98.5 F

## 2018-05-04 VITALS — OXYGEN SATURATION: 98 %

## 2018-05-04 VITALS
DIASTOLIC BLOOD PRESSURE: 84 MMHG | SYSTOLIC BLOOD PRESSURE: 170 MMHG | RESPIRATION RATE: 20 BRPM | OXYGEN SATURATION: 95 % | HEART RATE: 74 BPM

## 2018-05-04 VITALS
RESPIRATION RATE: 15 BRPM | OXYGEN SATURATION: 94 % | DIASTOLIC BLOOD PRESSURE: 79 MMHG | HEART RATE: 64 BPM | SYSTOLIC BLOOD PRESSURE: 166 MMHG

## 2018-05-04 VITALS
OXYGEN SATURATION: 97 % | RESPIRATION RATE: 17 BRPM | HEART RATE: 75 BPM | DIASTOLIC BLOOD PRESSURE: 70 MMHG | SYSTOLIC BLOOD PRESSURE: 146 MMHG

## 2018-05-04 VITALS
HEART RATE: 64 BPM | SYSTOLIC BLOOD PRESSURE: 129 MMHG | DIASTOLIC BLOOD PRESSURE: 58 MMHG | RESPIRATION RATE: 22 BRPM | OXYGEN SATURATION: 100 %

## 2018-05-04 VITALS — OXYGEN SATURATION: 97 %

## 2018-05-04 VITALS
DIASTOLIC BLOOD PRESSURE: 79 MMHG | HEART RATE: 59 BPM | TEMPERATURE: 97.5 F | RESPIRATION RATE: 16 BRPM | OXYGEN SATURATION: 99 % | SYSTOLIC BLOOD PRESSURE: 166 MMHG

## 2018-05-04 VITALS — HEART RATE: 76 BPM

## 2018-05-04 VITALS
HEART RATE: 60 BPM | DIASTOLIC BLOOD PRESSURE: 65 MMHG | RESPIRATION RATE: 17 BRPM | OXYGEN SATURATION: 95 % | SYSTOLIC BLOOD PRESSURE: 134 MMHG

## 2018-05-04 LAB
ALBUMIN SERPL-MCNC: 2.8 GM/DL (ref 3.4–5)
ALP SERPL-CCNC: 70 U/L (ref 45–117)
ALT SERPL-CCNC: 8 U/L (ref 10–53)
AST SERPL-CCNC: 14 U/L (ref 15–37)
BASOPHILS # BLD AUTO: 0 TH/MM3 (ref 0–0.2)
BASOPHILS NFR BLD: 0.6 % (ref 0–2)
BILIRUB SERPL-MCNC: 0.4 MG/DL (ref 0.2–1)
BUN SERPL-MCNC: 8 MG/DL (ref 7–18)
CALCIUM SERPL-MCNC: 8.1 MG/DL (ref 8.5–10.1)
CHLORIDE SERPL-SCNC: 107 MEQ/L (ref 98–107)
CHOLEST SERPL-MCNC: 289 MG/DL (ref 120–200)
CHOLESTEROL/ HDL RATIO: 7.74 RATIO
CREAT SERPL-MCNC: 1.1 MG/DL (ref 0.5–1)
EOSINOPHIL # BLD: 0.1 TH/MM3 (ref 0–0.4)
EOSINOPHIL NFR BLD: 2.1 % (ref 0–4)
ERYTHROCYTE [DISTWIDTH] IN BLOOD BY AUTOMATED COUNT: 16.2 % (ref 11.6–17.2)
GFR SERPLBLD BASED ON 1.73 SQ M-ARVRAT: 61 ML/MIN (ref 89–?)
GLUCOSE SERPL-MCNC: 100 MG/DL (ref 74–106)
HCO3 BLD-SCNC: 25.6 MEQ/L (ref 21–32)
HCT VFR BLD CALC: 36.2 % (ref 35–46)
HDLC SERPL-MCNC: 37.3 MG/DL (ref 40–60)
HGB BLD-MCNC: 12.1 GM/DL (ref 11.6–15.3)
LDLC SERPL-MCNC: 213 MG/DL (ref 0–99)
LYMPHOCYTES # BLD AUTO: 2.1 TH/MM3 (ref 1–4.8)
LYMPHOCYTES NFR BLD AUTO: 34.6 % (ref 9–44)
MCH RBC QN AUTO: 28.1 PG (ref 27–34)
MCHC RBC AUTO-ENTMCNC: 33.4 % (ref 32–36)
MCV RBC AUTO: 84.1 FL (ref 80–100)
MONOCYTE #: 0.8 TH/MM3 (ref 0–0.9)
MONOCYTES NFR BLD: 12.7 % (ref 0–8)
NEUTROPHILS # BLD AUTO: 3 TH/MM3 (ref 1.8–7.7)
NEUTROPHILS NFR BLD AUTO: 50 % (ref 16–70)
PLATELET # BLD: 358 TH/MM3 (ref 150–450)
PMV BLD AUTO: 7.3 FL (ref 7–11)
PROT SERPL-MCNC: 7.6 GM/DL (ref 6.4–8.2)
RBC # BLD AUTO: 4.3 MIL/MM3 (ref 4–5.3)
SODIUM SERPL-SCNC: 142 MEQ/L (ref 136–145)
TRIGL SERPL-MCNC: 194 MG/DL (ref 42–150)
WBC # BLD AUTO: 6 TH/MM3 (ref 4–11)

## 2018-05-04 RX ADMIN — HEPARIN SODIUM SCH UNITS: 10000 INJECTION, SOLUTION INTRAVENOUS; SUBCUTANEOUS at 12:44

## 2018-05-04 RX ADMIN — POTASSIUM CHLORIDE PRN MLS/HR: 200 INJECTION, SOLUTION INTRAVENOUS at 11:30

## 2018-05-04 RX ADMIN — STANDARDIZED SENNA CONCENTRATE AND DOCUSATE SODIUM SCH TAB: 8.6; 5 TABLET, FILM COATED ORAL at 08:45

## 2018-05-04 RX ADMIN — HEPARIN SODIUM SCH UNITS: 10000 INJECTION, SOLUTION INTRAVENOUS; SUBCUTANEOUS at 01:11

## 2018-05-04 RX ADMIN — CHLORHEXIDINE GLUCONATE SCH PACK: 500 CLOTH TOPICAL at 04:00

## 2018-05-04 RX ADMIN — DILTIAZEM HYDROCHLORIDE SCH MG: 60 TABLET, FILM COATED ORAL at 18:47

## 2018-05-04 RX ADMIN — ATORVASTATIN CALCIUM SCH MG: 40 TABLET, FILM COATED ORAL at 21:27

## 2018-05-04 RX ADMIN — NICARDIPINE HYDROCHLORIDE PRN MLS/HR: 2.5 INJECTION INTRAVENOUS at 02:25

## 2018-05-04 RX ADMIN — INSULIN ASPART SCH: 100 INJECTION, SOLUTION INTRAVENOUS; SUBCUTANEOUS at 21:00

## 2018-05-04 RX ADMIN — INSULIN ASPART SCH: 100 INJECTION, SOLUTION INTRAVENOUS; SUBCUTANEOUS at 17:00

## 2018-05-04 RX ADMIN — POTASSIUM CHLORIDE PRN MLS/HR: 200 INJECTION, SOLUTION INTRAVENOUS at 14:55

## 2018-05-04 RX ADMIN — DILTIAZEM HYDROCHLORIDE SCH MG: 60 TABLET, FILM COATED ORAL at 12:44

## 2018-05-04 RX ADMIN — Medication SCH ML: at 11:19

## 2018-05-04 RX ADMIN — INSULIN ASPART SCH: 100 INJECTION, SOLUTION INTRAVENOUS; SUBCUTANEOUS at 08:00

## 2018-05-04 RX ADMIN — POTASSIUM CHLORIDE PRN MLS/HR: 200 INJECTION, SOLUTION INTRAVENOUS at 12:44

## 2018-05-04 RX ADMIN — PHENYTOIN SODIUM SCH MLS/HR: 50 INJECTION INTRAMUSCULAR; INTRAVENOUS at 15:00

## 2018-05-04 RX ADMIN — POTASSIUM CHLORIDE PRN MLS/HR: 200 INJECTION, SOLUTION INTRAVENOUS at 05:43

## 2018-05-04 RX ADMIN — FAMOTIDINE SCH MG: 20 TABLET, FILM COATED ORAL at 21:28

## 2018-05-04 RX ADMIN — FAMOTIDINE SCH MG: 10 INJECTION, SOLUTION INTRAVENOUS at 08:46

## 2018-05-04 RX ADMIN — PHENYTOIN SODIUM SCH MLS/HR: 50 INJECTION INTRAMUSCULAR; INTRAVENOUS at 00:52

## 2018-05-04 RX ADMIN — INSULIN ASPART SCH: 100 INJECTION, SOLUTION INTRAVENOUS; SUBCUTANEOUS at 12:00

## 2018-05-04 RX ADMIN — ASPIRIN SCH MG: 325 TABLET ORAL at 08:45

## 2018-05-04 RX ADMIN — STANDARDIZED SENNA CONCENTRATE AND DOCUSATE SODIUM SCH TAB: 8.6; 5 TABLET, FILM COATED ORAL at 21:00

## 2018-05-04 RX ADMIN — Medication SCH ML: at 21:27

## 2018-05-04 NOTE — HHI.PR
Review/Management


Diagnosis


TIA vs hypertensive encephalopathy


Plan


continue asa 325 mg daily


follow up echo


ok to discharge from neuro standpoint if echo normal and BP control


scheduel fu with me in office 3 weeks


Diagnosis/Plan:  





Subjective


Subjective Comments


No acute events reported


No further speech changes. States her mouth was "twisted" when she woke up but 

did not last long


Active Medications





Current Medications








 Medications


  (Trade)  Dose


 Ordered  Sig/Elicia


 Route  Start Time


 Stop Time Status Last Admin


 


  (Aspirin)  325 mg  DAILY


 PO  5/3/18 12:00


    5/4/18 08:45


 


 


  (NovoLOG


 SUPPLEMENTAL


 SCALE)  1  ACHS


 SQ  5/3/18 12:00


     


 


 


  (D50w (Vial) Inj)  50 ml  UNSCH  PRN


 IV PUSH  5/3/18 12:00


     


 


 


  (Glucagon Inj)  1 mg  UNSCH  PRN


 OTHER  5/3/18 12:00


     


 


 


  (NS Flush)  2 ml  UNSCH  PRN


 IV FLUSH  5/3/18 12:00


     


 


 


  (NS Flush)  2 ml  BID


 IV FLUSH  5/3/18 21:00


    5/4/18 11:19


 


 


  (Tylenol)  650 mg  Q6H  PRN


 PO  5/3/18 12:00


    5/4/18 11:18


 


 


  (Duoneb Neb)  1 ampule  Q4HR NEB  PRN


 INH  5/3/18 12:00


     


 


 


  (Heparin Inj)  5,000 units  Q12H


 SQ  5/3/18 13:00


    5/4/18 12:44


 


 


  (Misc Nursing


 Information)  1  [id number]


 XX  5/3/18 12:00


     


 


 


  (Chlorhexidine


 2% Cloth)  3 pack


 Taper  DAILY@04


 TOP  5/4/18 04:00


 4/30/19 03:59  5/4/18 04:00


 


 


  (Chlorhexidine


 2% Cloth)  3 pack  UNSCH  PRN


 TOP  5/3/18 12:00


     


 


 


  (Ai-Colace)  1 tab  BID


 PO  5/3/18 21:00


    5/4/18 08:45


 


 


  (Milk Of


 Magnesia Liq)  30 ml  Q12H  PRN


 PO  5/3/18 12:00


     


 


 


  (Senokot)  17.2 mg  Q12H  PRN


 PO  5/3/18 12:00


     


 


 


  (Dulcolax Supp)  10 mg  DAILY  PRN


 RECTAL  5/3/18 12:00


     


 


 


  (Lactulose Liq)  30 ml  DAILY  PRN


 PO  5/3/18 12:00


     


 


 


  (Apresoline Inj)  20 mg  Q4H  PRN


 IV PUSH  5/3/18 14:45


     


 


 


  (Trandate Inj)  10 mg  Q4H  PRN


 IV PUSH  5/3/18 14:45


     


 


 


 Nicardipine HCl


 25 mg/Sodium


 Chloride  250 ml @ 


 50 mls/hr  TITRATE  PRN


 IV  5/3/18 16:30


   Future Hold 5/4/18 02:25


 


 


 Sodium Chloride  1,000 ml @ 


 84 mls/hr  [id number]


 IV  5/3/18 18:00


    5/4/18 15:00


 


 


 Potassium Chloride  100 ml @ 


 50 mls/hr  Q2H  PRN


 IV  5/4/18 04:45


     


 


 


 Potassium Chloride  100 ml @ 


 50 mls/hr  Q2H  PRN


 IV  5/4/18 04:45


    5/4/18 14:55


 


 


  (K-Lyte Cl  Eff)  50 meq  UNSCH  PRN


 PO  5/4/18 04:45


     


 


 


 Potassium Chloride  100 ml @ 


 25 mls/hr  UNSCH  PRN


 IV  5/4/18 04:45


     


 


 


 Potassium Chloride  100 ml @ 


 50 mls/hr  Q2H  PRN


 IV  5/4/18 04:45


     


 


 


 Magnesium Sulfate


 4 gm/Sodium


 Chloride  100 ml @ 


 50 mls/hr  UNSCH  PRN


 IV  5/4/18 04:45


     


 


 


  (Mag-Ox)  800 mg  UNSCH  PRN


 PO  5/4/18 04:45


     


 


 


 Magnesium Sulfate


 2 gm/Sodium


 Chloride  100 ml @ 


 50 mls/hr  UNSCH  PRN


 IV  5/4/18 04:45


     


 


 


  (K-Phos)  2,000 mg  Q4H  PRN


 PO  5/4/18 04:45


     


 


 


 Sodium Phosphate


 30 mmol/Sodium


 Chloride  250 ml @ 


 42 mls/hr  UNSCH  PRN


 IV  5/4/18 04:45


     


 


 


  (K-Phos)  2,000 mg  UNSCH  PRN


 PO/TUBE  5/4/18 04:45


     


 


 


 Potassium


 Phosphate 30 mmol/


 Sodium Chloride  260 ml @ 


 42 mls/hr  UNSCH  PRN


 IV  5/4/18 04:45


     


 


 


  (Cardizem)  60 mg  Q6H


 PO  5/4/18 13:00


    5/4/18 12:44


 


 


  (Lipitor)  40 mg  HS


 PO  5/4/18 21:00


     


 


 


  (Pepcid)  10 mg  BID


 PO  5/4/18 21:00


     


 








Allergies





Allergies


Coded Allergies


  iodine (Unverified Allergy, Severe, Hives, 8/15/17)


  potassium iodide (Unverified Allergy, Severe, Hives, 8/15/17)


  povidone-iodine (Unverified Allergy, Severe, Hives, 8/15/17)


  sodium iodide (Unverified Allergy, Severe, Hives, 8/15/17)


  sodium iodide (Unverified Allergy, Severe, Hives, 8/15/17)





Exam


I&O / VS











 5/4/18 5/4/18 5/5/18





 15:00 23:00 07:00


 


Intake Total 1150 ml  


 


Balance 1150 ml  


 


   


 


Intake IV Total 1150 ml  








Vital Signs








  Date Time  Temp Pulse Resp B/P (MAP) Pulse Ox O2 Delivery O2 Flow Rate FiO2


 


5/4/18 16:00 98.1 60 15 125/63 (83) 96   


 


5/4/18 16:00  60      


 


5/4/18 16:00 97.9       


 


5/4/18 15:00  65 31 143/73 (96) 97   


 


5/4/18 14:00  74      


 


5/4/18 14:00  74 20 170/84 (112) 95   


 


5/4/18 13:00  64 15 166/79 (108) 94   


 


5/4/18 12:00 97.5 59 16 166/79 (108) 99   


 


5/4/18 12:00  59      


 


5/4/18 11:00  75 17 146/70 (95) 97   


 


5/4/18 10:00  76      


 


5/4/18 10:00  76 24 137/67 (90) 100   


 


5/4/18 09:00  77 20 143/73 (96) 99   


 


5/4/18 08:45  64  108/62    


 


5/4/18 08:00  64      


 


5/4/18 08:00 98.5 64 19 108/62 (77) 99   


 


5/4/18 07:42     98 Nasal Cannula 2.00 


 


5/4/18 07:00 98.5 78 28 151/66 (94) 97   


 


5/4/18 06:00  79 27 139/65 (89) 96   


 


5/4/18 06:00  79      


 


5/4/18 05:00  65 17 126/58 (80) 99   


 


5/4/18 04:00  61      


 


5/4/18 04:00 98.9 61 16 120/58 (78) 98   


 


5/4/18 03:00  64 22 129/58 (81) 100   


 


5/4/18 02:25  65  124/60    


 


5/4/18 02:00  65 24 124/60 (81) 98   


 


5/4/18 02:00  65      


 


5/4/18 01:00  69 24 116/60 (78) 100   


 


5/4/18 00:00 99.0 78 21 159/72 (101) 99   


 


5/4/18 00:00  78      


 


5/3/18 23:00  74 18 160/72 (101) 98   


 


5/3/18 22:35     99 Nasal Cannula 2.00 


 


5/3/18 22:00  78 20 151/70 (97) 97   


 


5/3/18 22:00  78      


 


5/3/18 21:53  76  163/71    


 


5/3/18 21:00  73 21 156/70 (98) 99   


 


5/3/18 20:00  76      


 


5/3/18 20:00 99.3 76 24 147/65 (92) 100   


 


5/3/18 19:23  84  150/65    


 


5/3/18 19:00  83 19 150/65 (93) 99   








Exam Comments


alert, speech is normal. comprehension is normal


CN 2-12 normal


 motor--5/5 BUE





Objective


Radiology Results


MRI brain--normal


MRA brain--normal


carotid US--normal


Micro and Labs





Laboratory Tests








Test


  5/4/18


03:49


 


White Blood Count 6.0 


 


Red Blood Count 4.30 


 


Hemoglobin 12.1 


 


Hematocrit 36.2 


 


Mean Corpuscular Volume 84.1 


 


Mean Corpuscular Hemoglobin 28.1 


 


Mean Corpuscular Hemoglobin


Concent 33.4 


 


 


Red Cell Distribution Width 16.2 


 


Platelet Count 358 


 


Mean Platelet Volume 7.3 


 


Neutrophils (%) (Auto) 50.0 


 


Lymphocytes (%) (Auto) 34.6 


 


Monocytes (%) (Auto) 12.7 


 


Eosinophils (%) (Auto) 2.1 


 


Basophils (%) (Auto) 0.6 


 


Neutrophils # (Auto) 3.0 


 


Lymphocytes # (Auto) 2.1 


 


Monocytes # (Auto) 0.8 


 


Eosinophils # (Auto) 0.1 


 


Basophils # (Auto) 0.0 


 


CBC Comment DIFF FINAL 


 


Differential Comment  


 


Blood Urea Nitrogen 8 


 


Creatinine 1.10 


 


Random Glucose 100 


 


Total Protein 7.6 


 


Albumin 2.8 


 


Calcium Level 8.1 


 


Alkaline Phosphatase 70 


 


Aspartate Amino Transf


(AST/SGOT) 14 


 


 


Alanine Aminotransferase


(ALT/SGPT) 8 


 


 


Total Bilirubin 0.4 


 


Sodium Level 142 


 


Potassium Level 2.9 


 


Chloride Level 107 


 


Carbon Dioxide Level 25.6 


 


Anion Gap 9 


 


Estimat Glomerular Filtration


Rate 61 


 


 


Phosphorus Level 2.9 


 


Triglycerides Level 194 


 


Cholesterol Level 289 


 


LDL Cholesterol 213 


 


HDL Cholesterol 37.3 


 


Cholesterol/HDL Ratio 7.74 

















Shahid Dyer MD PhD May 4, 2018 18:05

## 2018-05-04 NOTE — PD.CONS
Assessment and Plan


Plan


Consult received per stroke order set.  EMR reviewed.





MRI negative for acute stroke.  Neurology consult reviewed and indicates TIA.





Consult deferred due to no acute stroke.  Please reconsult as appropriate.





Thank you.











Silvana Camacho MD May 4, 2018 20:04

## 2018-05-04 NOTE — HHI.CCPN
Subjective


Remarks/Hospital Course


This is a 63-year-old female that presented to  the [facility] ED with complaints 

of headache and some confusion.  Per report beginning last evening ,she had 

pain behind her left eye feeling like someone is pushing her eye out, constant, 

moderate severity associated with nausea but no vomiting.  For about an hour 

last evening she says she was confused and her words were not coming out right.

  Her daughter said she had slurred speech and she did not make any sense.  The 

patient's daughter directed her to report to the hospital last night , however 

she was reluctant secondary to lack of insurance.  This morning the patient's 

symptoms worsened, and she came to the ED.  She recently was diagnosed with 

hypertension and she was started on valsartan which she took last night at 6 

PM.  She also took Tylenol throughout the night for her pain.  In the ED , the 

patient was noted to be hypertensive nicardipine infusion was initiated and 

initial CT scan was performed which was negative.  Intermittently during the 

visit the patient began to have expressive aphasia and confusion, and a stroke 

alert was called.  Dr. Dyer evaluated the patient, repeat CT was performed, 

TPA was initially ordered however cancelled as the patient had resolution of 

symptoms returned to baseline.  Critical care medicine was consulted.





Subjective:


5/4: No acute events overnight.  Normal mentation.  Systolic blood pressure 

remained in the 150s on nicardipine 5 mg/hr. nicardipine placed on hold 

Cardizem dosing increased frequency to every 6 hours.  Noted elevated lipid 

panel patient placed on atorvastatin.  Advance diet this a.m..





Objective





Vital Signs








  Date Time  Temp Pulse Resp B/P (MAP) Pulse Ox O2 Delivery O2 Flow Rate FiO2


 


5/4/18 07:42     98 Nasal Cannula 2.00 


 


5/4/18 06:00  79 27 139/65 (89)    


 


5/4/18 04:00 98.9       














Intake and Output   


 


 5/4/18 5/4/18 5/5/18





 08:00 16:00 00:00


 


Output Total 1500 ml  


 


Balance -1500 ml  








Result Diagram:  


5/4/18 0349                                                                    

            5/4/18 0349





Imaging





Last Impressions








Head CT 5/3/18 0947 Signed





Impressions: 





 Service Date/Time:  Thursday, May 3, 2018 10:03 - CONCLUSION:  Negative 





 noncontrast CT brain.     Rosalio Carter MD 


 


Carotid Artery Ultrasound 5/3/18 0000 Signed





Impressions: 





 Service Date/Time:  Thursday, May 3, 2018 12:05 - CONCLUSION: Minimal plaque 





 with no evidence of stenosis.     Antoni Horne MD 








Objective Remarks


GENERAL: This is a well-developed well-nourished -American female alert 

and oriented pleasantly conversant, in no acute distress


SKIN: Warm and dry.


HEAD: Atraumatic. Normocephalic. 


EYES: Pupils equal and round. No scleral icterus. No injection or drainage. 


ENT: No nasal bleeding or discharge. Mucous membranes pink and moist.


NECK: Trachea midline. No JVD. 


CARDIOVASCULAR: Normal rate, regular rhythm. 


RESPIRATORY: No accessory muscle use. Clear to auscultation. Breath sounds 

equal bilaterally. 


GASTROINTESTINAL: Abdomen soft, non-tender, nondistended. No guarding. 


MUSCULOSKELETAL: Extremities without clubbing, cyanosis, or edema. No obvious 

deformities. 


NEUROLOGICAL: GCS 15. awake and alert. RASS 0. No gross focal/sensory deficits. 

Follows commands in all 4 extremities.


Urinary Catheter:  No





A/P


Problem List:  


(1) Hypertensive encephalopathy


ICD Code:  I67.4 - Hypertensive encephalopathy


Status:  Acute


(2) TIA (transient ischemic attack)


ICD Code:  G45.9 - Transient cerebral ischemic attack, unspecified


(3) GERD (gastroesophageal reflux disease)


ICD Code:  K21.9 - Gastro-esophageal reflux disease without esophagitis


(4) Hypertensive urgency


ICD Code:  I16.0 - Hypertensive urgency


Assessment and Plan





Plan by systems: 


Neurologic:


TIA


Hypertensive encephalopathy


Neurology following-Dr. Dyer


Follow-up MRI/MRA brain


5/3 carotid Doppler study-Minimal plaque with no evidence of stenosis. 


5/3 Initial CT brain negative


5/3 Repeat CT-1. Stable noncontrast head CT. No acute intracranial abnormality 

is identified. Stable mild periventricular white matter low-attenuation likely 

representing chronic microvascular ischemia. There is also an asymmetric area 

of low density in the right centrum semiovale which may represent an area of 

old ischemia.


Maintain head of bed flat


 mg/day





 





Respiratory:


Maintain O2 saturation greater than 92%, wean FiO2


Incentive spirometry


Duo nebs every 4 hours as needed for wheezing





Cardiovascular:


Hypertensive Urgency


Nicardipine 5 mg/hour (hold)


Increase Cardizem 60 mg p.o. every 6 hrs


F/U  echo


Hydralazine 20 mg every 4 hours for SBP greater than 160mmHg


Labetalol 10 mg every 4 hours as needed for SBP greater than 180mmHg





Renal:


No De La Garza indicated


-- Strict I/Os 





FEN/GI:


Electrolyte abnormality


Monitor BMP


Lipid panel-cholesterol 289, triglycerides 194, -begin atorvastatin 40 

mg nightly


Heart healthy diet


Normal saline 84 cc/hr-to be discontinued upon toleration of diet


Zofran for nausea 


Famotidine GI prophylaxis


Potassium level 2.9-repletion in progress





Heme/ID:


Monitor CBC





Endocrine:


Glucose monitoring per ICU protocol


Hemoglobin A1c- 5.4


-- SSI 





Prophylaxis:


GI Prophylaxis


Famotidine twice daily


DVT Prophylaxis


-- SCDs


Heparin 5000 SQ BID





Lines:


Peripheral IVs 2.  Central line if indicated


Dispo:


Discussed with family at bedside provided medical status update, ICU RN at 

bedside (Ankit).


Level 3 follow-up


Physician


Tomsaa Salamanca MD May 4, 2018 07:52

## 2018-05-04 NOTE — EKG
Date Performed: 05/03/2018       Time Performed: 11:06:34

 

PTAGE:      63 years

 

EKG:      Sinus rhythm 

 

 NONSPECIFIC T-WAVE ABNORMALITY BORDERLINE ECG 

 

NO PREVIOUS TRACING            

 

DOCTOR:   Eric Butterfield  Interpretating Date/Time  05/04/2018 16:29:11

## 2018-05-05 VITALS
DIASTOLIC BLOOD PRESSURE: 90 MMHG | RESPIRATION RATE: 23 BRPM | HEART RATE: 86 BPM | OXYGEN SATURATION: 98 % | SYSTOLIC BLOOD PRESSURE: 170 MMHG

## 2018-05-05 VITALS
OXYGEN SATURATION: 100 % | DIASTOLIC BLOOD PRESSURE: 73 MMHG | TEMPERATURE: 98.2 F | HEART RATE: 61 BPM | RESPIRATION RATE: 16 BRPM | SYSTOLIC BLOOD PRESSURE: 168 MMHG

## 2018-05-05 VITALS
OXYGEN SATURATION: 100 % | RESPIRATION RATE: 26 BRPM | DIASTOLIC BLOOD PRESSURE: 82 MMHG | SYSTOLIC BLOOD PRESSURE: 179 MMHG | HEART RATE: 83 BPM

## 2018-05-05 VITALS
SYSTOLIC BLOOD PRESSURE: 159 MMHG | OXYGEN SATURATION: 100 % | HEART RATE: 67 BPM | DIASTOLIC BLOOD PRESSURE: 75 MMHG | TEMPERATURE: 98.6 F | RESPIRATION RATE: 22 BRPM

## 2018-05-05 VITALS
RESPIRATION RATE: 22 BRPM | DIASTOLIC BLOOD PRESSURE: 77 MMHG | HEART RATE: 91 BPM | SYSTOLIC BLOOD PRESSURE: 160 MMHG | OXYGEN SATURATION: 100 %

## 2018-05-05 VITALS
DIASTOLIC BLOOD PRESSURE: 106 MMHG | HEART RATE: 88 BPM | RESPIRATION RATE: 18 BRPM | OXYGEN SATURATION: 99 % | SYSTOLIC BLOOD PRESSURE: 167 MMHG

## 2018-05-05 VITALS
TEMPERATURE: 98.1 F | SYSTOLIC BLOOD PRESSURE: 172 MMHG | RESPIRATION RATE: 24 BRPM | OXYGEN SATURATION: 79 % | DIASTOLIC BLOOD PRESSURE: 79 MMHG | HEART RATE: 76 BPM

## 2018-05-05 VITALS
TEMPERATURE: 98.3 F | HEART RATE: 95 BPM | OXYGEN SATURATION: 100 % | DIASTOLIC BLOOD PRESSURE: 80 MMHG | SYSTOLIC BLOOD PRESSURE: 171 MMHG | RESPIRATION RATE: 26 BRPM

## 2018-05-05 VITALS
OXYGEN SATURATION: 100 % | HEART RATE: 80 BPM | DIASTOLIC BLOOD PRESSURE: 79 MMHG | SYSTOLIC BLOOD PRESSURE: 142 MMHG | RESPIRATION RATE: 18 BRPM | TEMPERATURE: 98 F

## 2018-05-05 VITALS
OXYGEN SATURATION: 99 % | RESPIRATION RATE: 24 BRPM | HEART RATE: 72 BPM | DIASTOLIC BLOOD PRESSURE: 74 MMHG | SYSTOLIC BLOOD PRESSURE: 152 MMHG | TEMPERATURE: 98.2 F

## 2018-05-05 VITALS
DIASTOLIC BLOOD PRESSURE: 70 MMHG | RESPIRATION RATE: 26 BRPM | SYSTOLIC BLOOD PRESSURE: 151 MMHG | HEART RATE: 80 BPM | OXYGEN SATURATION: 100 %

## 2018-05-05 VITALS
HEART RATE: 77 BPM | OXYGEN SATURATION: 95 % | DIASTOLIC BLOOD PRESSURE: 73 MMHG | SYSTOLIC BLOOD PRESSURE: 154 MMHG | RESPIRATION RATE: 6 BRPM

## 2018-05-05 VITALS — HEART RATE: 77 BPM

## 2018-05-05 VITALS — HEART RATE: 109 BPM | RESPIRATION RATE: 44 BRPM

## 2018-05-05 VITALS — HEART RATE: 70 BPM

## 2018-05-05 VITALS — HEART RATE: 79 BPM

## 2018-05-05 VITALS — OXYGEN SATURATION: 100 %

## 2018-05-05 VITALS — HEART RATE: 62 BPM

## 2018-05-05 RX ADMIN — INSULIN ASPART SCH: 100 INJECTION, SOLUTION INTRAVENOUS; SUBCUTANEOUS at 08:00

## 2018-05-05 RX ADMIN — DILTIAZEM HYDROCHLORIDE SCH MG: 60 TABLET, FILM COATED ORAL at 01:24

## 2018-05-05 RX ADMIN — PHENYTOIN SODIUM SCH MLS/HR: 50 INJECTION INTRAMUSCULAR; INTRAVENOUS at 20:03

## 2018-05-05 RX ADMIN — ATORVASTATIN CALCIUM SCH MG: 40 TABLET, FILM COATED ORAL at 20:03

## 2018-05-05 RX ADMIN — INSULIN ASPART SCH: 100 INJECTION, SOLUTION INTRAVENOUS; SUBCUTANEOUS at 20:04

## 2018-05-05 RX ADMIN — Medication SCH ML: at 20:03

## 2018-05-05 RX ADMIN — DILTIAZEM HYDROCHLORIDE SCH MG: 60 TABLET, FILM COATED ORAL at 12:17

## 2018-05-05 RX ADMIN — DILTIAZEM HYDROCHLORIDE SCH MG: 60 TABLET, FILM COATED ORAL at 19:16

## 2018-05-05 RX ADMIN — CHLORHEXIDINE GLUCONATE SCH PACK: 500 CLOTH TOPICAL at 04:00

## 2018-05-05 RX ADMIN — HEPARIN SODIUM SCH UNITS: 10000 INJECTION, SOLUTION INTRAVENOUS; SUBCUTANEOUS at 12:17

## 2018-05-05 RX ADMIN — INSULIN ASPART SCH: 100 INJECTION, SOLUTION INTRAVENOUS; SUBCUTANEOUS at 16:51

## 2018-05-05 RX ADMIN — INSULIN ASPART SCH: 100 INJECTION, SOLUTION INTRAVENOUS; SUBCUTANEOUS at 12:00

## 2018-05-05 RX ADMIN — ASPIRIN SCH MG: 325 TABLET ORAL at 08:18

## 2018-05-05 RX ADMIN — FAMOTIDINE SCH MG: 20 TABLET, FILM COATED ORAL at 08:19

## 2018-05-05 RX ADMIN — PHENYTOIN SODIUM SCH MLS/HR: 50 INJECTION INTRAMUSCULAR; INTRAVENOUS at 20:14

## 2018-05-05 RX ADMIN — STANDARDIZED SENNA CONCENTRATE AND DOCUSATE SODIUM SCH TAB: 8.6; 5 TABLET, FILM COATED ORAL at 20:04

## 2018-05-05 RX ADMIN — STANDARDIZED SENNA CONCENTRATE AND DOCUSATE SODIUM SCH TAB: 8.6; 5 TABLET, FILM COATED ORAL at 08:19

## 2018-05-05 RX ADMIN — FAMOTIDINE SCH MG: 20 TABLET, FILM COATED ORAL at 20:03

## 2018-05-05 RX ADMIN — DILTIAZEM HYDROCHLORIDE SCH MG: 60 TABLET, FILM COATED ORAL at 07:38

## 2018-05-05 RX ADMIN — HEPARIN SODIUM SCH UNITS: 10000 INJECTION, SOLUTION INTRAVENOUS; SUBCUTANEOUS at 01:24

## 2018-05-05 RX ADMIN — VALSARTAN SCH MG: 160 TABLET ORAL at 20:36

## 2018-05-05 RX ADMIN — Medication SCH ML: at 08:19

## 2018-05-05 NOTE — ECHRPT
Indication:   CVA/TIA

 

 CONCLUSIONS

 The left ventricular systolic function is low normal with an estimated ejection fraction in the rang
e of 50-

 55%. 

 Moderate concentric left ventricular hypertrophy. 

 Normal left ventricular size. 

 Calcification of the anterior mitral valve leaflet. 

 Trace-to-mild mitral valve regurgitation. 

 

 

 BP:  139   / 65      HR: 79                       Rhythm:           Sinus

 

 MEASUREMENTS  (Male / Female) Normal Values       Technical Quality:Fair

 2D ECHO

 LV Diastolic Diameter PLAX        4.5 cm                4.2 - 5.9 / 3.9 - 5.3 cm

 LV Systolic Diameter PLAX         2.9 cm                

 IVS Diastolic Thickness           1.6 cm                0.6 - 1.0 / 0.6 - 0.9 cm

 LVPW Diastolic Thickness          1.5 cm                0.6 - 1.0 / 0.6 - 0.9 cm

 LV Relative Wall Thickness        0.7                   

 RV Internal Dim ED PLAX           3.8 cm                

 LVOT Diameter                     1.7 cm                

 LA Systolic Diameter LX           3.7 cm                3.0 - 4.0 / 2.7 - 3.8 cm

 

 M-MODE

 Aortic Root Diameter MM           2.5 cm                

 LA Systolic Diameter MM           3.6 cm                

 LA Ao Ratio MM                    1.4                   

 AV Cusp Separation MM             1.6 cm                

 

 DOPPLER

 AV Peak Velocity                  155.0 cm/s            

 AV Peak Gradient                  9.6 mmHg              

 LVOT Peak Velocity                103.0 cm/s            

 LVOT Peak Gradient                4.2 mmHg              

 AV Area Cont Eq pk                1.5 cm               

 MV Area PHT                       2.9 cm               

 Mitral E Point Velocity           52.8 cm/s             

 Mitral A Point Velocity           74.5 cm/s             

 Mitral E to A Ratio               0.7                   

 LV E' Lateral Velocity            3.7 cm/s              

 Mitral E to LV E' Lateral Ratio   14.3                  

 LV E' Septal Velocity             5.8 cm/s              

 Mitral E to LV E' Septal Ratio    9.2                   

 

 

 FINDINGS

 

 LEFT VENTRICLE

 The left ventricular systolic function is low normal with an estimated ejection fraction in the rang
e of 50-

 55%. 

 Moderate concentric left ventricular hypertrophy. 

 Normal left ventricular size. 

 Doppler parameters are consistent with impaired left ventricular relaxtion (grade 1 diastolic dysfun
ction). 

 

 RIGHT VENTRICLE

 Normal right ventricular size and systolic function.  

 

 LEFT ATRIUM

 The left atrial size is normal.  

 

 RIGHT ATRIUM

 The right atrial size is normal.  

 

 ATRIAL SEPTUM

 Normal atrial septal thickness without atrial level shunting by limited color doppler interrogation.
  

 

 AORTA

 The aortic root and proximal ascending aorta are normal in size on limited imaging.  

 

 MITRAL VALVE

 Calcification of the anterior mitral valve leaflet. 

 Trace-to-mild mitral valve regurgitation. 

 

 AORTIC VALVE

 Trileaflet aortic valve. No aortic valve stenosis or regurgitation.  

 

 TRICUSPID VALVE

 There is trace tricuspid valve regurgitation. 

 

 

 PULMONARY VALVE

 No pulmonary valve regurgitation or stenosis. 

 

 VESSELS

 The inferior vena cava is normal in size.  

 

 PERICARDIUM

 No pericardial effusion.  

 

 

 

 

  Fahad Starks MD

  (Electronically Signed)

  Final Date:05 May 2018 15:33

## 2018-05-06 VITALS
RESPIRATION RATE: 18 BRPM | DIASTOLIC BLOOD PRESSURE: 86 MMHG | SYSTOLIC BLOOD PRESSURE: 173 MMHG | HEART RATE: 75 BPM | OXYGEN SATURATION: 94 % | TEMPERATURE: 98.2 F

## 2018-05-06 VITALS
TEMPERATURE: 98.1 F | OXYGEN SATURATION: 97 % | RESPIRATION RATE: 18 BRPM | DIASTOLIC BLOOD PRESSURE: 81 MMHG | SYSTOLIC BLOOD PRESSURE: 180 MMHG | HEART RATE: 79 BPM

## 2018-05-06 VITALS
RESPIRATION RATE: 18 BRPM | HEART RATE: 68 BPM | TEMPERATURE: 98.4 F | OXYGEN SATURATION: 94 % | SYSTOLIC BLOOD PRESSURE: 157 MMHG | DIASTOLIC BLOOD PRESSURE: 85 MMHG

## 2018-05-06 VITALS
SYSTOLIC BLOOD PRESSURE: 154 MMHG | RESPIRATION RATE: 16 BRPM | DIASTOLIC BLOOD PRESSURE: 77 MMHG | TEMPERATURE: 98.7 F | OXYGEN SATURATION: 92 % | HEART RATE: 80 BPM

## 2018-05-06 VITALS
OXYGEN SATURATION: 97 % | TEMPERATURE: 97.4 F | DIASTOLIC BLOOD PRESSURE: 85 MMHG | SYSTOLIC BLOOD PRESSURE: 151 MMHG | HEART RATE: 74 BPM | RESPIRATION RATE: 18 BRPM

## 2018-05-06 VITALS — HEART RATE: 79 BPM

## 2018-05-06 VITALS — HEART RATE: 81 BPM

## 2018-05-06 VITALS — HEART RATE: 71 BPM

## 2018-05-06 VITALS — HEART RATE: 77 BPM

## 2018-05-06 VITALS — HEART RATE: 74 BPM

## 2018-05-06 LAB
ALBUMIN SERPL-MCNC: 3.2 GM/DL (ref 3.4–5)
ALP SERPL-CCNC: 79 U/L (ref 45–117)
ALT SERPL-CCNC: 12 U/L (ref 10–53)
AST SERPL-CCNC: 28 U/L (ref 15–37)
BASOPHILS # BLD AUTO: 0 TH/MM3 (ref 0–0.2)
BASOPHILS NFR BLD: 0.3 % (ref 0–2)
BILIRUB SERPL-MCNC: 0.3 MG/DL (ref 0.2–1)
BUN SERPL-MCNC: 10 MG/DL (ref 7–18)
BUN SERPL-MCNC: 11 MG/DL (ref 7–18)
CALCIUM SERPL-MCNC: 9 MG/DL (ref 8.5–10.1)
CALCIUM SERPL-MCNC: 9.4 MG/DL (ref 8.5–10.1)
CHLORIDE SERPL-SCNC: 106 MEQ/L (ref 98–107)
CHLORIDE SERPL-SCNC: 108 MEQ/L (ref 98–107)
CREAT SERPL-MCNC: 1.11 MG/DL (ref 0.5–1)
CREAT SERPL-MCNC: 1.16 MG/DL (ref 0.5–1)
EOSINOPHIL # BLD: 0.2 TH/MM3 (ref 0–0.4)
EOSINOPHIL NFR BLD: 2.8 % (ref 0–4)
ERYTHROCYTE [DISTWIDTH] IN BLOOD BY AUTOMATED COUNT: 16.3 % (ref 11.6–17.2)
GFR SERPLBLD BASED ON 1.73 SQ M-ARVRAT: 57 ML/MIN (ref 89–?)
GFR SERPLBLD BASED ON 1.73 SQ M-ARVRAT: 60 ML/MIN (ref 89–?)
GLUCOSE SERPL-MCNC: 73 MG/DL (ref 74–106)
GLUCOSE SERPL-MCNC: 87 MG/DL (ref 74–106)
HCO3 BLD-SCNC: 25.1 MEQ/L (ref 21–32)
HCO3 BLD-SCNC: 26.6 MEQ/L (ref 21–32)
HCT VFR BLD CALC: 39.5 % (ref 35–46)
HGB BLD-MCNC: 13.1 GM/DL (ref 11.6–15.3)
LYMPHOCYTES # BLD AUTO: 2.5 TH/MM3 (ref 1–4.8)
LYMPHOCYTES NFR BLD AUTO: 40.1 % (ref 9–44)
MCH RBC QN AUTO: 28.2 PG (ref 27–34)
MCHC RBC AUTO-ENTMCNC: 33.1 % (ref 32–36)
MCV RBC AUTO: 85 FL (ref 80–100)
MONOCYTE #: 0.8 TH/MM3 (ref 0–0.9)
MONOCYTES NFR BLD: 12.5 % (ref 0–8)
NEUTROPHILS # BLD AUTO: 2.7 TH/MM3 (ref 1.8–7.7)
NEUTROPHILS NFR BLD AUTO: 44.3 % (ref 16–70)
PLATELET # BLD: 427 TH/MM3 (ref 150–450)
PMV BLD AUTO: 7.4 FL (ref 7–11)
PROT SERPL-MCNC: 8.6 GM/DL (ref 6.4–8.2)
RBC # BLD AUTO: 4.64 MIL/MM3 (ref 4–5.3)
SODIUM SERPL-SCNC: 140 MEQ/L (ref 136–145)
SODIUM SERPL-SCNC: 141 MEQ/L (ref 136–145)
WBC # BLD AUTO: 6.1 TH/MM3 (ref 4–11)

## 2018-05-06 RX ADMIN — DILTIAZEM HYDROCHLORIDE SCH MG: 60 TABLET, FILM COATED ORAL at 06:26

## 2018-05-06 RX ADMIN — HEPARIN SODIUM SCH UNITS: 10000 INJECTION, SOLUTION INTRAVENOUS; SUBCUTANEOUS at 13:57

## 2018-05-06 RX ADMIN — STANDARDIZED SENNA CONCENTRATE AND DOCUSATE SODIUM SCH TAB: 8.6; 5 TABLET, FILM COATED ORAL at 21:00

## 2018-05-06 RX ADMIN — INSULIN ASPART SCH: 100 INJECTION, SOLUTION INTRAVENOUS; SUBCUTANEOUS at 12:00

## 2018-05-06 RX ADMIN — DILTIAZEM HYDROCHLORIDE SCH MG: 60 TABLET, FILM COATED ORAL at 18:24

## 2018-05-06 RX ADMIN — PHENYTOIN SODIUM SCH MLS/HR: 50 INJECTION INTRAMUSCULAR; INTRAVENOUS at 17:30

## 2018-05-06 RX ADMIN — ATORVASTATIN CALCIUM SCH MG: 40 TABLET, FILM COATED ORAL at 21:42

## 2018-05-06 RX ADMIN — PHENYTOIN SODIUM SCH MLS/HR: 50 INJECTION INTRAMUSCULAR; INTRAVENOUS at 05:35

## 2018-05-06 RX ADMIN — INSULIN ASPART SCH: 100 INJECTION, SOLUTION INTRAVENOUS; SUBCUTANEOUS at 21:00

## 2018-05-06 RX ADMIN — INSULIN ASPART SCH: 100 INJECTION, SOLUTION INTRAVENOUS; SUBCUTANEOUS at 17:00

## 2018-05-06 RX ADMIN — ASPIRIN SCH MG: 325 TABLET ORAL at 08:57

## 2018-05-06 RX ADMIN — FAMOTIDINE SCH MG: 20 TABLET, FILM COATED ORAL at 21:42

## 2018-05-06 RX ADMIN — STANDARDIZED SENNA CONCENTRATE AND DOCUSATE SODIUM SCH TAB: 8.6; 5 TABLET, FILM COATED ORAL at 09:00

## 2018-05-06 RX ADMIN — Medication SCH ML: at 21:00

## 2018-05-06 RX ADMIN — DILTIAZEM HYDROCHLORIDE SCH MG: 60 TABLET, FILM COATED ORAL at 00:34

## 2018-05-06 RX ADMIN — HEPARIN SODIUM SCH UNITS: 10000 INJECTION, SOLUTION INTRAVENOUS; SUBCUTANEOUS at 00:34

## 2018-05-06 RX ADMIN — CHLORHEXIDINE GLUCONATE SCH PACK: 500 CLOTH TOPICAL at 03:45

## 2018-05-06 RX ADMIN — VALSARTAN SCH MG: 160 TABLET ORAL at 08:57

## 2018-05-06 RX ADMIN — INSULIN ASPART SCH: 100 INJECTION, SOLUTION INTRAVENOUS; SUBCUTANEOUS at 07:44

## 2018-05-06 RX ADMIN — DILTIAZEM HYDROCHLORIDE SCH MG: 60 TABLET, FILM COATED ORAL at 13:56

## 2018-05-06 RX ADMIN — FAMOTIDINE SCH MG: 20 TABLET, FILM COATED ORAL at 08:58

## 2018-05-06 RX ADMIN — Medication SCH ML: at 09:00

## 2018-05-06 NOTE — HHI.PR
Subjective


Remarks


Late entry - patient seen on 5/5 at 


Denies cp/sob.


denies headache


BP still labile


Patient c/o chronic diarrhea for the last 4 to 5 months. Denies fevers, chills, 

weight loss. Denies hematochezia.





Objective


Vitals





Vital Signs








  Date Time  Temp Pulse Resp B/P (MAP) Pulse Ox O2 Delivery O2 Flow Rate FiO2


 


5/6/18 08:32      Nasal Cannula  


 


5/6/18 08:00 98.7 80 16 154/77 (102) 92   


 


5/6/18 01:35  74      


 


5/6/18 01:20 97.4 74 18 151/85 (107) 97   


 


5/5/18 22:00  77 6 154/73 (100) 95   


 


5/5/18 21:33  86 23 170/90 (116) 98   


 


5/5/18 21:30  109 44     


 


5/5/18 21:00  88 18 167/106 (126) 99   


 


5/5/18 20:30  91 22 160/77 (104) 100   


 


5/5/18 20:00 98.3 95 26 171/80 (110) 100   


 


5/5/18 20:00  95      


 


5/5/18 19:30  80 26 151/70 (97) 100   


 


5/5/18 19:00  83 26 179/82 (114) 100   


 


5/5/18 18:00  79      


 


5/5/18 16:00  72      


 


5/5/18 16:00 98.2 72 24 152/74 (100) 99   


 


5/5/18 14:00  79      


 


5/5/18 12:00  76      


 


5/5/18 12:00 98.1 76 24 172/79 (110) 79   


 


5/5/18 10:00  70      














I/O      


 


 5/5/18 5/5/18 5/5/18 5/6/18 5/6/18 5/6/18





 07:00 15:00 23:00 07:00 15:00 23:00


 


Intake Total   800 ml   


 


Output Total 900 ml  750 ml   


 


Balance -900 ml  50 ml   


 


      


 


Intake Oral   800 ml   


 


Output Urine Total 900 ml  750 ml   


 


# Voids    1  


 


# Bowel Movements 5  3   








Result Diagram:  


5/4/18 0349                                                                    

            5/4/18 2151





Objective Remarks


AAOx3


nad


PERRLA


Clear lungs BL


S1S2 RRR, no MRG


abdomen, soft, nt





A/P


Problem List:  


(1) Hypertensive encephalopathy


ICD Code:  I67.4 - Hypertensive encephalopathy


Status:  Acute


Plan:  BP improved but still labile


Off Cardene drip.


Continue amlodipine 60 mg po Q 6 hrs.


resume Diovan





(2) TIA (transient ischemic attack)


ICD Code:  G45.9 - Transient cerebral ischemic attack, unspecified


Plan:  CT head, MRI, MRA, carotid us no significant stenosis.


echo with ef 50 to 55%


Neurology cleared to be discharged if echo normal





(3) Hypertensive urgency


ICD Code:  I16.0 - Hypertensive urgency


Plan:  BP better


Resume diovan





(4) Chronic diarrhea


ICD Code:  K52.9 - Noninfective gastroenteritis and colitis, unspecified


Plan:  Patient states can't hold stool. Has had diarrhea for several months.


Check stool studies, cdiff pcr, stool for fat.


If diarrhea better can be referred to outpatient GI for EGD/Colonoscopy.


If no imptovement consider GI consultation





Assessment and Plan


DVT proph: heparin sQ


Discharge Planning


pending stool studies - might need GI consultation











Sarthak Goldstein MD May 6, 2018 09:46

## 2018-05-06 NOTE — HHI.PR
Subjective


Remarks


No headache no chest pain or short of breath


Blood pressure in better control


Patient reported having chronic diarrhea since October with blood, she does not 

have a resource to follow-up with PCP or GI





Objective


Vitals





Vital Signs








  Date Time  Temp Pulse Resp B/P (MAP) Pulse Ox O2 Delivery O2 Flow Rate FiO2


 


5/6/18 17:02  71      


 


5/6/18 14:29     96 Nasal Cannula 2.00 


 


5/6/18 14:29  77      


 


5/6/18 12:00 98.4 68 18 157/85 (109) 94   


 


5/6/18 08:32      Nasal Cannula  


 


5/6/18 08:00  68      


 


5/6/18 08:00 98.7 80 16 154/77 (102) 92   


 


5/6/18 01:35  74      


 


5/6/18 01:20 97.4 74 18 151/85 (107) 97   


 


5/5/18 22:00  77 6 154/73 (100) 95   


 


5/5/18 21:33  86 23 170/90 (116) 98   


 


5/5/18 21:30  109 44     


 


5/5/18 21:00  88 18 167/106 (126) 99   


 


5/5/18 20:30  91 22 160/77 (104) 100   


 


5/5/18 20:00 98.3 95 26 171/80 (110) 100   


 


5/5/18 20:00  95      


 


5/5/18 19:30  80 26 151/70 (97) 100   


 


5/5/18 19:00  83 26 179/82 (114) 100   














I/O      


 


 5/5/18 5/5/18 5/5/18 5/6/18 5/6/18 5/6/18





 07:00 15:00 23:00 07:00 15:00 23:00


 


Intake Total   800 ml   


 


Output Total 900 ml  750 ml   


 


Balance -900 ml  50 ml   


 


      


 


Intake Oral   800 ml   


 


Output Urine Total 900 ml  750 ml   


 


# Voids    1  


 


# Bowel Movements 5  3   








Result Diagram:  


5/6/18 1011                                                                    

            5/6/18 1300





Objective Remarks


GENERAL: This is a well-nourished, well-developed patient, in no apparent 

distress.


CARDIOVASCULAR: RRR,  no gallops, or rubs. 


RESPIRATORY: Fair air entry bilaterally. No W, R, or R


GASTROINTESTINAL: Abdomen soft, non-tender, nondistended.  Positive bowel sounds


MUSCULOSKELETAL: Extremities without clubbing, cyanosis, or edema.  Pedal 

pulses appreciated


NEUROLOGICAL: Awake and alert.  Moves all extremity.  Normal speech.no focal 

neurological deficit





A/P


Problem List:  


(1) Hypertensive encephalopathy


ICD Code:  I67.4 - Hypertensive encephalopathy


Status:  Acute


(2) TIA (transient ischemic attack)


ICD Code:  G45.9 - Transient cerebral ischemic attack, unspecified


(3) Hypertensive urgency


ICD Code:  I16.0 - Hypertensive urgency


(4) Chronic diarrhea


ICD Code:  K52.9 - Noninfective gastroenteritis and colitis, unspecified


Assessment and Plan





5/8: Appreciate GI consultation going for colonoscopy today, further Plan: 

After colonoscopy results





A/P:





(1) Hypertensive encephalopathy


Hypertensive encephalopathy


 BP improved but still labile


Off Cardene drip.


Continue amlodipine 60 mg po Q 6 hrs.


resume Diovan





(2) TIA (transient ischemic attack)


Transient cerebral ischemic attack, unspecified


 CT head, MRI, MRA, carotid us no significant stenosis.


echo with ef 50 to 55%


Neurology cleared to be discharged if echo normal





(3) Hypertensive urgency


  BP better


Resume diovan





(4) Chronic diarrhea


Noninfective gastroenteritis and colitis, unspecified


  Patient states can't hold stool. Has had diarrhea for several months.


Check stool studies, cdiff pcr, stool for fat.


If diarrhea better can be referred to outpatient GI for EGD/Colonoscopy.


If no imptovement consider GI consultation








DVT proph: heparin sQ


Discharge Planning


pending stool studies - might need GI consultation











Lucinda Chew MD May 6, 2018 18:07

## 2018-05-07 VITALS
DIASTOLIC BLOOD PRESSURE: 75 MMHG | OXYGEN SATURATION: 95 % | SYSTOLIC BLOOD PRESSURE: 153 MMHG | RESPIRATION RATE: 18 BRPM | TEMPERATURE: 98 F | HEART RATE: 67 BPM

## 2018-05-07 VITALS
RESPIRATION RATE: 18 BRPM | HEART RATE: 89 BPM | OXYGEN SATURATION: 96 % | SYSTOLIC BLOOD PRESSURE: 162 MMHG | DIASTOLIC BLOOD PRESSURE: 85 MMHG | TEMPERATURE: 98.2 F

## 2018-05-07 VITALS
RESPIRATION RATE: 18 BRPM | SYSTOLIC BLOOD PRESSURE: 145 MMHG | HEART RATE: 62 BPM | TEMPERATURE: 98.3 F | DIASTOLIC BLOOD PRESSURE: 93 MMHG | OXYGEN SATURATION: 93 %

## 2018-05-07 VITALS
HEART RATE: 82 BPM | DIASTOLIC BLOOD PRESSURE: 83 MMHG | SYSTOLIC BLOOD PRESSURE: 179 MMHG | RESPIRATION RATE: 17 BRPM | OXYGEN SATURATION: 97 % | TEMPERATURE: 98.3 F

## 2018-05-07 VITALS — HEART RATE: 65 BPM

## 2018-05-07 VITALS
HEART RATE: 67 BPM | DIASTOLIC BLOOD PRESSURE: 81 MMHG | RESPIRATION RATE: 18 BRPM | OXYGEN SATURATION: 95 % | SYSTOLIC BLOOD PRESSURE: 167 MMHG | TEMPERATURE: 98 F

## 2018-05-07 VITALS — HEART RATE: 64 BPM

## 2018-05-07 VITALS — OXYGEN SATURATION: 94 %

## 2018-05-07 VITALS
RESPIRATION RATE: 17 BRPM | DIASTOLIC BLOOD PRESSURE: 95 MMHG | SYSTOLIC BLOOD PRESSURE: 165 MMHG | TEMPERATURE: 97.9 F | OXYGEN SATURATION: 99 % | HEART RATE: 77 BPM

## 2018-05-07 VITALS — HEART RATE: 72 BPM

## 2018-05-07 VITALS — HEART RATE: 66 BPM

## 2018-05-07 LAB
COLOR UR: (no result)
GLUCOSE UR STRIP-MCNC: (no result) MG/DL
HGB UR QL STRIP: (no result)
KETONES UR STRIP-MCNC: (no result) MG/DL
MUCOUS THREADS #/AREA URNS LPF: (no result) /LPF
NITRITE UR QL STRIP: (no result)
SP GR UR STRIP: 1.01 (ref 1–1.03)
SQUAMOUS #/AREA URNS HPF: 2 /HPF (ref 0–5)
URINE LEUKOCYTE ESTERASE: (no result)

## 2018-05-07 RX ADMIN — INSULIN ASPART SCH: 100 INJECTION, SOLUTION INTRAVENOUS; SUBCUTANEOUS at 08:00

## 2018-05-07 RX ADMIN — STANDARDIZED SENNA CONCENTRATE AND DOCUSATE SODIUM SCH TAB: 8.6; 5 TABLET, FILM COATED ORAL at 21:00

## 2018-05-07 RX ADMIN — VALSARTAN SCH MG: 160 TABLET ORAL at 09:09

## 2018-05-07 RX ADMIN — FAMOTIDINE SCH MG: 20 TABLET, FILM COATED ORAL at 21:26

## 2018-05-07 RX ADMIN — Medication SCH ML: at 09:00

## 2018-05-07 RX ADMIN — Medication SCH ML: at 21:26

## 2018-05-07 RX ADMIN — FAMOTIDINE SCH MG: 20 TABLET, FILM COATED ORAL at 09:08

## 2018-05-07 RX ADMIN — STANDARDIZED SENNA CONCENTRATE AND DOCUSATE SODIUM SCH TAB: 8.6; 5 TABLET, FILM COATED ORAL at 09:00

## 2018-05-07 RX ADMIN — DILTIAZEM HYDROCHLORIDE SCH MG: 60 TABLET, FILM COATED ORAL at 00:15

## 2018-05-07 RX ADMIN — DILTIAZEM HYDROCHLORIDE SCH MG: 60 TABLET, FILM COATED ORAL at 12:07

## 2018-05-07 RX ADMIN — HEPARIN SODIUM SCH UNITS: 10000 INJECTION, SOLUTION INTRAVENOUS; SUBCUTANEOUS at 00:15

## 2018-05-07 RX ADMIN — ATORVASTATIN CALCIUM SCH MG: 40 TABLET, FILM COATED ORAL at 21:26

## 2018-05-07 RX ADMIN — HEPARIN SODIUM SCH UNITS: 10000 INJECTION, SOLUTION INTRAVENOUS; SUBCUTANEOUS at 12:07

## 2018-05-07 RX ADMIN — CHLORHEXIDINE GLUCONATE SCH PACK: 500 CLOTH TOPICAL at 03:22

## 2018-05-07 RX ADMIN — PHENYTOIN SODIUM SCH MLS/HR: 50 INJECTION INTRAMUSCULAR; INTRAVENOUS at 17:59

## 2018-05-07 RX ADMIN — ASPIRIN SCH MG: 325 TABLET ORAL at 09:08

## 2018-05-07 RX ADMIN — DILTIAZEM HYDROCHLORIDE SCH MG: 60 TABLET, FILM COATED ORAL at 05:42

## 2018-05-07 RX ADMIN — INSULIN ASPART SCH: 100 INJECTION, SOLUTION INTRAVENOUS; SUBCUTANEOUS at 12:00

## 2018-05-07 RX ADMIN — DILTIAZEM HYDROCHLORIDE SCH MG: 60 TABLET, FILM COATED ORAL at 17:59

## 2018-05-07 RX ADMIN — PHENYTOIN SODIUM SCH MLS/HR: 50 INJECTION INTRAMUSCULAR; INTRAVENOUS at 05:42

## 2018-05-07 NOTE — HHI.PR
Subjective


Remarks


Reported dysuria will check UA, otherwise no acute issue awaiting result of FOBT





Objective


Vitals





Vital Signs








  Date Time  Temp Pulse Resp B/P (MAP) Pulse Ox O2 Delivery O2 Flow Rate FiO2


 


5/7/18 12:07 98.0 67 18 153/75 (101) 95   


 


5/7/18 08:09     94   21


 


5/7/18 08:05 98.3 62 18 145/93 (110) 93   


 


5/7/18 08:00      Room Air  


 


5/7/18 08:00  66      


 


5/7/18 04:00 98.2 89 18 162/85 (110) 96   


 


5/7/18 04:00      Room Air  


 


5/7/18 03:41  64      


 


5/7/18 00:00 98.3 82 17 179/83 (115) 97   


 


5/6/18 23:40  81      


 


5/6/18 20:00 98.1 79 18 180/81 (114) 97   


 


5/6/18 20:00      Room Air  


 


5/6/18 19:41  79      


 


5/6/18 17:02  71      


 


5/6/18 16:00 98.2 75 18 173/86 (115) 94   


 


5/6/18 16:00     94 Room Air  


 


5/6/18 14:29     96 Nasal Cannula 2.00 


 


5/6/18 14:29  77      














I/O      


 


 5/6/18 5/6/18 5/6/18 5/7/18 5/7/18 5/7/18





 06:59 14:59 22:59 06:59 14:59 22:59


 


Intake Total   480 ml 1007 ml  


 


Balance   480 ml 1007 ml  


 


      


 


Intake Oral   480 ml 120 ml  


 


IV Total    887 ml  


 


# Voids 1  4 5  


 


# Bowel Movements   1 1  








Result Diagram:  


5/6/18 1011                                                                    

            5/6/18 1300





Objective Remarks


GENERAL: This is a well-nourished, well-developed patient, in no apparent 

distress.


CARDIOVASCULAR: RRR,  no gallops, or rubs. 


RESPIRATORY: Fair air entry bilaterally. No W, R, or R


GASTROINTESTINAL: Abdomen soft, non-tender, nondistended.  Positive bowel sounds


MUSCULOSKELETAL: Extremities without clubbing, cyanosis, or edema.  Pedal 

pulses appreciated


NEUROLOGICAL: Awake and alert.  Moves all extremity.  Normal speech.no focal 

neurological deficit





A/P


Problem List:  


(1) Hypertensive encephalopathy


ICD Code:  I67.4 - Hypertensive encephalopathy


Status:  Acute


(2) TIA (transient ischemic attack)


ICD Code:  G45.9 - Transient cerebral ischemic attack, unspecified


(3) Hypertensive urgency


ICD Code:  I16.0 - Hypertensive urgency


(4) Chronic diarrhea


ICD Code:  K52.9 - Noninfective gastroenteritis and colitis, unspecified


Assessment and Plan





5/6: Reported chronic bloody diarrhea, stool studies still pending awaiting 

FOBT if positive will consult GI


5/7: Appreciate GI consultation plan for colonoscopy in a.m.








A/P:


(1) Hypertensive encephalopathy


Hypertensive encephalopathy


 BP improved but still labile


Off Cardene drip.


Continue amlodipine 60 mg po Q 6 hrs.


resume Diovan





(2) TIA (transient ischemic attack)


Transient cerebral ischemic attack, unspecified


 CT head, MRI, MRA, carotid us no significant stenosis.


echo with ef 50 to 55%


Neurology cleared to be discharged if echo normal





(3) Hypertensive urgency


  BP better


Resume diovan





(4) Chronic diarrhea


Noninfective gastroenteritis and colitis, unspecified


  Patient states can't hold stool. Has had diarrhea for several months.


Check stool studies, cdiff pcr, stool for fat.


If diarrhea better can be referred to outpatient GI for EGD/Colonoscopy.


If no imptovement consider GI consultation








DVT proph: heparin sQ


Discharge Planning


pending stool studies - might need GI consultation











Lucinda Chew MD May 7, 2018 14:21

## 2018-05-07 NOTE — PD.CONS
HPI


History of Present Illness


This is a 63 year old M with PMH significant for asthma and HTN who presented 

to the ER four days ago with complaints of headache and confusion. Pt was 

initially admitted to ICU for hypertensive urgency with encephalopathy and 

placed on Nicardipine gtt, she is now on medical floor and blood pressure 

better controlled. Our service has been consulted to evaluate pt for chronic 

diarrhea. States has been having between 5 and 9 BMs a day since October 

associated with fecal urgency and incontinence.  Reports that in October when 

symptoms began she was noticing some BRB but denies any recent episodes of 

bleeding.  Has tried OTC Imodium with mild relief. Reports a warm sensation in 

her abdomen prior to having a BM. Denies any abdominal pain, nausea, vomiting, 

acid reflux, heartburn, unintentional weight loss. Has never had EGD or 

colonoscopy.  Denies EROH, smoking. Denies family history significant for UC, 

Crohns, colon cancer. 


 (Josie Gracia)





PFSH


Past Medical History


Asthma


HTN


HLD


Past Surgical History


Tonsillectomy 


 (Josie Gracia)


Coded Allergies:  


     iodine (Unverified  Allergy, Severe, Hives, 8/15/17)


     potassium iodide (Unverified  Allergy, Severe, Hives, 8/15/17)


     povidone-iodine (Unverified  Allergy, Severe, Hives, 8/15/17)


     sodium iodide (Unverified  Allergy, Severe, Hives, 8/15/17)


     sodium iodide (Unverified  Allergy, Severe, Hives, 8/15/17)


Social History


Denies ETOH, smoking, illicit drugs 


 (Josie Gracia)





Review of Systems


Gastrointestinal:  COMPLAINS OF: Diarrhea, DENIES: Abdominal pain, Black stools

, Bloody stools, Constipation, Nausea, Vomiting, Difficulty Swallowing, 

Swelling of Abdomen, Heartburn, Hematemesis (Josie Gracia)





GI Exam


Vitals I&O





Vital Signs








  Date Time  Temp Pulse Resp B/P (MAP) Pulse Ox O2 Delivery O2 Flow Rate FiO2


 


5/7/18 12:07 98.0 67 18 153/75 (101) 95   


 


5/7/18 08:09     94   21


 


5/7/18 08:05 98.3 62 18 145/93 (110) 93   


 


5/7/18 08:00      Room Air  


 


5/7/18 08:00  66      


 


5/7/18 04:00 98.2 89 18 162/85 (110) 96   


 


5/7/18 04:00      Room Air  


 


5/7/18 03:41  64      


 


5/7/18 00:00 98.3 82 17 179/83 (115) 97   


 


5/6/18 23:40  81      


 


5/6/18 20:00 98.1 79 18 180/81 (114) 97   


 


5/6/18 20:00      Room Air  


 


5/6/18 19:41  79      


 


5/6/18 17:02  71      


 


5/6/18 16:00 98.2 75 18 173/86 (115) 94   


 


5/6/18 16:00     94 Room Air  


 


5/6/18 14:29     96 Nasal Cannula 2.00 


 


5/6/18 14:29  77      














I/O      


 


 5/6/18 5/6/18 5/6/18 5/7/18 5/7/18 5/7/18





 06:59 14:59 22:59 06:59 14:59 22:59


 


Intake Total   480 ml 1007 ml  


 


Balance   480 ml 1007 ml  


 


      


 


Intake Oral   480 ml 120 ml  


 


IV Total    887 ml  


 


# Voids 1  4 5  


 


# Bowel Movements   1 1  








Imaging





Last Impressions








Head CT 5/3/18 0947 Signed





Impressions: 





 Service Date/Time:  Thursday, May 3, 2018 10:03 - CONCLUSION:  Negative 





 noncontrast CT brain.     Rosalio Carter MD 


 


Head Magnetic Resonance Angiography 5/3/18 0000 Signed





Impressions: 





 Service Date/Time:  Thursday, May 3, 2018 13:13 - CONCLUSION:  No evidence of 





 vessel truncation.     Rosalio Carter MD 


 


Carotid Artery Ultrasound 5/3/18 0000 Signed





Impressions: 





 Service Date/Time:  Thursday, May 3, 2018 12:05 - CONCLUSION: Minimal plaque 





 with no evidence of stenosis.     Antoni Horne MD 


 


Brain MRI 5/3/18 0000 Signed





Impressions: 





 Service Date/Time:  Thursday, May 3, 2018 13:13 - CONCLUSION:  1. No acute 





 findings in the brain. 2. Nonspecific areas of T2 prolongation in the 





 periventricular white matter and involving the mid-corpus callosum.     Rosalio Carter MD 








Laboratory











Test


  5/7/18


05:40


 


Eosinophil Stool Smear NONE SEEN /HPF 














 Date/Time


Source Procedure


Growth Status


 


 


 5/7/18 05:40


Stool Stool Cryptosporidium Exam - Final


NEGATIVE - NO CRYPTOSPORIDIUM ANTIGEN... Resulted





 5/7/18 05:40


Stool Stool Stool Pus (LORY)


Pending Resulted





 5/7/18 05:40


Stool Stool Giardia Antigen (LORY) - Final


NEGATIVE - NO GIARDIA ANTIGEN DETECTE... Resulted


 


 5/7/18 05:40


Stool Stool Stool Occult Blood (LORY)


Pending Resulted








Physical Examination


HEENT: Normocephalic; atraumatic


CHEST:  Even/unlabored 


CARDIAC:  RRR


ABDOMEN: Obese, soft, nontender, bowel sounds active 


SKIN:  Normal; no rash; no jaundice.


CNS:  No focal deficits; alert and oriented times three.


 (Josie Gracia)





Assessment and Plan


Plan


Assessment:


- Diarrhea, chronic since October- reports 5-9 BMs a day.


  Has tried OTC Imodium with mild relief. Denies any associated


  abdominal pain or unintentional weight loss. States some BRB


  in her stool when symptoms began in October but has not 


  noticed any recently. 


  Negative ova and parasites. 


  Enteric pathogens stool pending. 


  Denies family history of UC, Crohns, colon cancer.


  Has never had EGD or colonoscopy


- Hypertensive urgency- initially on nicardipine gtt - currently


  controlled on Diovan and Cardizem


- TIA- cleared by neurology for DC - on Heparin BID





Plan:


Colonoscopy tomorrow


Obtain consent


Clear liquids today


NPO after MN


Enteric pathogens stool pending


Further recommendations based on clinical course and results of above





Pt has been seen and examined by myself and Dr. Holliday and this note is 

written on his behalf 


 (Josie Gracia)


Physician Comments


Seen and examined with ANDREW, colonoscopy planned for tomorrow. thank you


 (Erwin Holliday MD)











Josie Gracia May 7, 2018 14:36


Erwin Holliday MD May 7, 2018 15:56

## 2018-05-08 VITALS — HEART RATE: 81 BPM

## 2018-05-08 VITALS
OXYGEN SATURATION: 95 % | TEMPERATURE: 97.9 F | HEART RATE: 68 BPM | RESPIRATION RATE: 20 BRPM | DIASTOLIC BLOOD PRESSURE: 60 MMHG | SYSTOLIC BLOOD PRESSURE: 152 MMHG

## 2018-05-08 VITALS — HEART RATE: 65 BPM

## 2018-05-08 VITALS
RESPIRATION RATE: 20 BRPM | TEMPERATURE: 97.6 F | SYSTOLIC BLOOD PRESSURE: 175 MMHG | HEART RATE: 79 BPM | OXYGEN SATURATION: 96 % | DIASTOLIC BLOOD PRESSURE: 92 MMHG

## 2018-05-08 VITALS
DIASTOLIC BLOOD PRESSURE: 88 MMHG | SYSTOLIC BLOOD PRESSURE: 162 MMHG | OXYGEN SATURATION: 94 % | RESPIRATION RATE: 19 BRPM | HEART RATE: 74 BPM | TEMPERATURE: 97.6 F

## 2018-05-08 VITALS
SYSTOLIC BLOOD PRESSURE: 144 MMHG | OXYGEN SATURATION: 94 % | HEART RATE: 90 BPM | DIASTOLIC BLOOD PRESSURE: 76 MMHG | RESPIRATION RATE: 18 BRPM | TEMPERATURE: 98.4 F

## 2018-05-08 VITALS
RESPIRATION RATE: 20 BRPM | DIASTOLIC BLOOD PRESSURE: 82 MMHG | HEART RATE: 80 BPM | TEMPERATURE: 98.1 F | OXYGEN SATURATION: 96 % | SYSTOLIC BLOOD PRESSURE: 181 MMHG

## 2018-05-08 VITALS
HEART RATE: 82 BPM | OXYGEN SATURATION: 95 % | DIASTOLIC BLOOD PRESSURE: 74 MMHG | TEMPERATURE: 98 F | SYSTOLIC BLOOD PRESSURE: 158 MMHG | RESPIRATION RATE: 20 BRPM

## 2018-05-08 VITALS — HEART RATE: 75 BPM

## 2018-05-08 VITALS — HEART RATE: 93 BPM

## 2018-05-08 PROCEDURE — 0DBE8ZX EXCISION OF LARGE INTESTINE, VIA NATURAL OR ARTIFICIAL OPENING ENDOSCOPIC, DIAGNOSTIC: ICD-10-PCS | Performed by: INTERNAL MEDICINE

## 2018-05-08 RX ADMIN — STANDARDIZED SENNA CONCENTRATE AND DOCUSATE SODIUM SCH TAB: 8.6; 5 TABLET, FILM COATED ORAL at 21:00

## 2018-05-08 RX ADMIN — PHENYTOIN SODIUM SCH MLS/HR: 50 INJECTION INTRAMUSCULAR; INTRAVENOUS at 06:10

## 2018-05-08 RX ADMIN — VALSARTAN SCH MG: 160 TABLET ORAL at 08:48

## 2018-05-08 RX ADMIN — DILTIAZEM HYDROCHLORIDE SCH MG: 60 TABLET, FILM COATED ORAL at 18:12

## 2018-05-08 RX ADMIN — ATORVASTATIN CALCIUM SCH MG: 40 TABLET, FILM COATED ORAL at 21:25

## 2018-05-08 RX ADMIN — PHENYTOIN SODIUM SCH MLS/HR: 50 INJECTION INTRAMUSCULAR; INTRAVENOUS at 21:31

## 2018-05-08 RX ADMIN — ASPIRIN SCH MG: 325 TABLET ORAL at 08:48

## 2018-05-08 RX ADMIN — FAMOTIDINE SCH MG: 20 TABLET, FILM COATED ORAL at 21:25

## 2018-05-08 RX ADMIN — HEPARIN SODIUM SCH UNITS: 10000 INJECTION, SOLUTION INTRAVENOUS; SUBCUTANEOUS at 00:37

## 2018-05-08 RX ADMIN — FAMOTIDINE SCH MG: 20 TABLET, FILM COATED ORAL at 08:49

## 2018-05-08 RX ADMIN — DILTIAZEM HYDROCHLORIDE SCH MG: 60 TABLET, FILM COATED ORAL at 14:10

## 2018-05-08 RX ADMIN — HEPARIN SODIUM SCH UNITS: 10000 INJECTION, SOLUTION INTRAVENOUS; SUBCUTANEOUS at 13:00

## 2018-05-08 RX ADMIN — Medication SCH ML: at 21:00

## 2018-05-08 RX ADMIN — DILTIAZEM HYDROCHLORIDE SCH MG: 60 TABLET, FILM COATED ORAL at 00:37

## 2018-05-08 RX ADMIN — DILTIAZEM HYDROCHLORIDE SCH MG: 60 TABLET, FILM COATED ORAL at 06:04

## 2018-05-08 RX ADMIN — CHLORHEXIDINE GLUCONATE SCH PACK: 500 CLOTH TOPICAL at 04:00

## 2018-05-08 RX ADMIN — MESALAMINE SCH MG: 800 TABLET, DELAYED RELEASE ORAL at 21:26

## 2018-05-08 RX ADMIN — STANDARDIZED SENNA CONCENTRATE AND DOCUSATE SODIUM SCH TAB: 8.6; 5 TABLET, FILM COATED ORAL at 08:49

## 2018-05-08 RX ADMIN — Medication SCH ML: at 08:49

## 2018-05-08 RX ADMIN — MESALAMINE SCH MG: 800 TABLET, DELAYED RELEASE ORAL at 14:23

## 2018-05-08 NOTE — GIPROC
[facility]

[address] [facility]. [facility], [address]

 

 

COLONOSCOPY PROCEDURE REPORT     EXAM DATE: 05/08/2018

 

PATIENT NAME:      Belen Bonilla           MR #:      [id number]

YOB: 1955      VISIT #:     [id number]

ENDOSCOPIST:     Erwin Holliday MD     ORDER #:     [id number]

ASSISTANT:      Lorrie Villeda and Shae Borges     STATUS:     inpatient

 

 

INDICATIONS:  The patient is a 63 yr old female here for a colonoscopy due to

change in bowel habits and abdominal pain

PROCEDURE PERFORMED:     Colonoscopy with biopsy

MEDICATIONS:     None and Per Anesthesia.

PREP QUALITY:     The Frederick Bowel Prep Score was Right colon 1, Mid colon 2,

and Left colon 2.  Total = 5. PREP TYPE:GoLytely

ESTIMATED BLOOD LOSS:     None

 

CONSENT: The patient understands the risks and benefits of the procedure and

understands that these risks include, but are not limited to: sedation,

allergic reaction, infection, perforation and/or bleeding. Alternative means of

evaluation and treatment include, among others: physical exam, x-rays, and/or

surgical intervention. The patient elects to proceed with this endoscopic

procedure.

 



medical equipment was checked for proper function. Hand hygiene and appropriate

measures for infection prevention was taken. After the risks, benefits and

alternatives of the procedure were thoroughly explained, Informed consent was

verified, confirmed and timeout was successfully executed by the treatment

team. A digital exam revealed external hemorrhoids The Pentax [id number]

endoscope was introduced through the anus and advanced to the cecum, which was

identified by both the appendix and ileocecal valve. The instrument was then

slowly withdrawn as the colon was fully examined.

 

 

COLON FINDINGS: A circumferential diffuse patch of colitis was found throughout

the entire examined colon.  The mucosa was congested, edematous, erythematous

and friable.  This is consistent with ulcerative colitis disease.  Multiple

biopsies were performed using cold forceps. Retroflexed views revealed internal

hemorrhoids and Retroflexed views revealed small internal hemorrhoids The scope

was then completely withdrawn from the patient and the procedure terminated.

PROCEDURE WITHDRAWAL TIME:6minutes

 

 

 

ADVERSE EVENTS:      There were no complications.

IMPRESSIONS:     1.  Circumferential diffuse colitis was found throughout the

entire examined colon; The mucosa was congested, edematous, erythematous and

friable; This is consistent with ulcerative colitis.; multiple biopsies were

performed using cold forceps

2.  Retroflexed views revealed internal hemorrhoids

3.  Retroflexed views revealed small internal hemorrhoids

4.  Revealed external hemorrhoids

 

RECOMMENDATIONS:     1.  Await biopsy results.  Biopsy results will not be ready

for 7-10 days.  If you don't hear from us in two weeks, call our office for

results.

2.  Continue surveillance

3.  Yearly hemoccult

4.  Asacol 800mg 1 po tid ac meals.  GI fu upon dc

5.  Follow-up: GI Clinic 2 week(s)

RECALL:     Return 1 year Colonoscopy, pending biopsy results

 

_____________________________

Erwin Holliday MD

eSigned:  Erwin Holliday MD 05/08/2018 1:15 PM

 

 

cc:

 

 

 

 

PATIENT NAME:  Belen Bonilla

MR#: [id number]

## 2018-05-08 NOTE — HHI.PR
Subjective


Remarks


Reported no pain today, going for colonoscopy regarding her chronic diarrhea





Objective


Vitals





Vital Signs








  Date Time  Temp Pulse Resp B/P (MAP) Pulse Ox O2 Delivery O2 Flow Rate FiO2


 


5/8/18 13:23 97.9 80 18 142/66 (91) 97   


 


5/8/18 12:00  93      


 


5/8/18 11:10 98.1 80 20 181/82 (115) 96   


 


5/8/18 08:00 97.6 82 20 175/92 (119) 96   


 


5/8/18 08:00  79      


 


5/8/18 08:00      Room Air  


 


5/8/18 04:05  81      


 


5/8/18 04:00 98.4 90 18 144/76 (98) 94   


 


5/8/18 00:18  65      


 


5/8/18 00:00 97.6 74 19 162/88 (112) 94   


 


5/7/18 20:49        21


 


5/7/18 20:18  72      


 


5/7/18 20:15      Room Air  


 


5/7/18 20:00 97.9 77 17 165/95 (118) 99   


 


5/7/18 16:05 98.0 67 18 167/81 (109) 95   


 


5/7/18 16:00  65      














I/O      


 


 5/7/18 5/7/18 5/7/18 5/8/18 5/8/18 5/8/18





 07:00 15:00 23:00 07:00 15:00 23:00


 


Intake Total 1007 ml  480 ml 0 ml 300 ml 


 


Output Total    550 ml  


 


Balance 1007 ml  480 ml -550 ml 300 ml 


 


      


 


Intake Oral 120 ml  480 ml 0 ml  


 


IV Total 887 ml     


 


Other     300 ml 


 


Output Urine Total    550 ml  


 


# Voids 5  5   


 


# Bowel Movements 1  8 5  








Result Diagram:  


5/6/18 1011                                                                    

            5/6/18 1300





Objective Remarks


GENERAL: This is a well-nourished, well-developed patient, in no apparent 

distress.


CARDIOVASCULAR: RRR,  no gallops, or rubs. 


RESPIRATORY: Fair air entry bilaterally. No W, R, or R


GASTROINTESTINAL: Abdomen soft, non-tender, nondistended.  Positive bowel sounds


MUSCULOSKELETAL: Extremities without clubbing, cyanosis, or edema.  Pedal 

pulses appreciated


NEUROLOGICAL: Awake and alert.  Moves all extremity.  Normal speech.no focal 

neurological deficit





A/P


Problem List:  


(1) Hypertensive encephalopathy


ICD Code:  I67.4 - Hypertensive encephalopathy


Status:  Acute


(2) TIA (transient ischemic attack)


ICD Code:  G45.9 - Transient cerebral ischemic attack, unspecified


(3) Hypertensive urgency


ICD Code:  I16.0 - Hypertensive urgency


(4) Chronic diarrhea


ICD Code:  K52.9 - Noninfective gastroenteritis and colitis, unspecified


Assessment and Plan





5/8: Appreciate GI consultation going for colonoscopy today, further Plan: 

After colonoscopy results


Blood pressure still uncontrolled I added hydralazine 10 mg p.o. twice daily





Addendum colonoscopy came back positive for colitis mostly ulcerative colitis, 

started on Asacol per GI, biopsy pending,





A/P:





(1) Hypertensive encephalopathy


Hypertensive encephalopathy


 BP improved but still labile


Off Cardene drip.


Continue amlodipine 60 mg po Q 6 hrs.


resume Diovan





(2) TIA (transient ischemic attack)


Transient cerebral ischemic attack, unspecified


 CT head, MRI, MRA, carotid us no significant stenosis.


echo with ef 50 to 55%


Neurology cleared to be discharged if echo normal





(3) Hypertensive urgency


  BP better


Resume diovan





(4) Chronic diarrhea


Noninfective gastroenteritis and colitis, unspecified


  Patient states can't hold stool. Has had diarrhea for several months.


Check stool studies, cdiff pcr, stool for fat.


If diarrhea better can be referred to outpatient GI for EGD/Colonoscopy.


If no imptovement consider GI consultation








DVT proph: heparin sQ


Discharge Planning


pending stool studies - might need GI consultation


Discharge Planning


In a.m. if stable on Asacol and cleared by Lucinda Aden MD May 8, 2018 15:30

## 2018-05-09 VITALS
DIASTOLIC BLOOD PRESSURE: 83 MMHG | RESPIRATION RATE: 20 BRPM | TEMPERATURE: 97.8 F | OXYGEN SATURATION: 91 % | SYSTOLIC BLOOD PRESSURE: 155 MMHG | HEART RATE: 74 BPM

## 2018-05-09 VITALS
DIASTOLIC BLOOD PRESSURE: 92 MMHG | HEART RATE: 72 BPM | SYSTOLIC BLOOD PRESSURE: 144 MMHG | TEMPERATURE: 98.6 F | OXYGEN SATURATION: 96 % | RESPIRATION RATE: 17 BRPM

## 2018-05-09 VITALS — HEART RATE: 77 BPM

## 2018-05-09 VITALS
OXYGEN SATURATION: 94 % | DIASTOLIC BLOOD PRESSURE: 90 MMHG | HEART RATE: 72 BPM | SYSTOLIC BLOOD PRESSURE: 154 MMHG | TEMPERATURE: 97.9 F | RESPIRATION RATE: 16 BRPM

## 2018-05-09 VITALS
OXYGEN SATURATION: 94 % | SYSTOLIC BLOOD PRESSURE: 169 MMHG | TEMPERATURE: 97.9 F | RESPIRATION RATE: 20 BRPM | DIASTOLIC BLOOD PRESSURE: 83 MMHG | HEART RATE: 72 BPM

## 2018-05-09 VITALS — HEART RATE: 64 BPM

## 2018-05-09 RX ADMIN — HEPARIN SODIUM SCH UNITS: 10000 INJECTION, SOLUTION INTRAVENOUS; SUBCUTANEOUS at 00:07

## 2018-05-09 RX ADMIN — STANDARDIZED SENNA CONCENTRATE AND DOCUSATE SODIUM SCH TAB: 8.6; 5 TABLET, FILM COATED ORAL at 10:25

## 2018-05-09 RX ADMIN — VALSARTAN SCH MG: 160 TABLET ORAL at 10:25

## 2018-05-09 RX ADMIN — FAMOTIDINE SCH MG: 20 TABLET, FILM COATED ORAL at 10:25

## 2018-05-09 RX ADMIN — ASPIRIN SCH MG: 325 TABLET ORAL at 10:25

## 2018-05-09 RX ADMIN — DILTIAZEM HYDROCHLORIDE SCH MG: 60 TABLET, FILM COATED ORAL at 06:09

## 2018-05-09 RX ADMIN — MESALAMINE SCH MG: 800 TABLET, DELAYED RELEASE ORAL at 14:30

## 2018-05-09 RX ADMIN — MESALAMINE SCH MG: 800 TABLET, DELAYED RELEASE ORAL at 06:09

## 2018-05-09 RX ADMIN — DILTIAZEM HYDROCHLORIDE SCH MG: 60 TABLET, FILM COATED ORAL at 14:31

## 2018-05-09 RX ADMIN — DILTIAZEM HYDROCHLORIDE SCH MG: 60 TABLET, FILM COATED ORAL at 00:06

## 2018-05-09 RX ADMIN — HEPARIN SODIUM SCH UNITS: 10000 INJECTION, SOLUTION INTRAVENOUS; SUBCUTANEOUS at 13:00

## 2018-05-09 RX ADMIN — CHLORHEXIDINE GLUCONATE SCH PACK: 500 CLOTH TOPICAL at 04:00

## 2018-05-09 RX ADMIN — PHENYTOIN SODIUM SCH MLS/HR: 50 INJECTION INTRAMUSCULAR; INTRAVENOUS at 10:32

## 2018-05-09 RX ADMIN — Medication SCH ML: at 10:27

## 2018-05-09 NOTE — HHI.GIFU
Subjective


Remarks


Pt OOB to chair.  Family at bedside.  


 (Siobhan Logan)





Objective


Vitals I&O





Vital Signs








  Date Time  Temp Pulse Resp B/P (MAP) Pulse Ox O2 Delivery O2 Flow Rate FiO2


 


5/9/18 08:00 97.8 67 20 155/83 (107) 91   


 


5/9/18 07:00      Room Air  


 


5/9/18 04:00 97.9 72 16 154/90 (111) 94   


 


5/9/18 03:40  64      


 


5/9/18 00:15  77      


 


5/9/18 00:00 98.6 72 17 144/92 (109) 96   


 


5/8/18 20:30      Room Air  


 


5/8/18 20:15  75      


 


5/8/18 20:00 97.9 68 20 152/60 (90) 95   


 


5/8/18 16:00 98.0 82 20 158/74 (102) 95   


 


5/8/18 13:23 97.9 80 18 142/66 (91) 97   














I/O      


 


 5/8/18 5/8/18 5/8/18 5/9/18 5/9/18 5/9/18





 07:00 15:00 23:00 07:00 15:00 23:00


 


Intake Total 0 ml 300 ml 1240 ml 965 ml  


 


Output Total 550 ml     


 


Balance -550 ml 300 ml 1240 ml 965 ml  


 


      


 


Intake Oral 0 ml  240 ml 240 ml  


 


IV Total   1000 ml 725 ml  


 


Other  300 ml    


 


Output Urine Total 550 ml     


 


# Voids   4 4  


 


# Bowel Movements 5  5 2  








Laboratory











 Date/Time


Source Procedure


Growth Status


 


 


 5/7/18 05:40


Stool Stool Cryptosporidium Exam - Final


NEGATIVE - NO CRYPTOSPORIDIUM ANTIGEN... Complete





 5/7/18 05:40


Stool Stool Stool Pus (LORY) - Final


RARE WBC Complete





 5/7/18 05:40


Stool Stool Giardia Antigen (LORY) - Final


NEGATIVE - NO GIARDIA ANTIGEN DETECTE... Complete


 


 5/7/18 05:40


Stool Stool Stool Occult Blood (LORY) - Final


HEMOCCULT NEGATIVE Complete








Imaging





Last Impressions








Head CT 5/3/18 0947 Signed





Impressions: 





 Service Date/Time:  Thursday, May 3, 2018 10:03 - CONCLUSION:  Negative 





 noncontrast CT brain.     Rosalio Carter MD 


 


Head Magnetic Resonance Angiography 5/3/18 0000 Signed





Impressions: 





 Service Date/Time:  Thursday, May 3, 2018 13:13 - CONCLUSION:  No evidence of 





 vessel truncation.     Rosalio Carter MD 


 


Carotid Artery Ultrasound 5/3/18 0000 Signed





Impressions: 





 Service Date/Time:  Thursday, May 3, 2018 12:05 - CONCLUSION: Minimal plaque 





 with no evidence of stenosis.     Antoni Horne MD 


 


Brain MRI 5/3/18 0000 Signed





Impressions: 





 Service Date/Time:  Thursday, May 3, 2018 13:13 - CONCLUSION:  1. No acute 





 findings in the brain. 2. Nonspecific areas of T2 prolongation in the 





 periventricular white matter and involving the mid-corpus callosum.     Rosalio Carter MD 








Physical Exam


HEENT: PERRL; normocephalic; atraumatic; no jaundice.  


CHEST:  CTA


CARDIAC:  RRR


ABDOMEN:  Soft, obese, nontender; no hepatosplenomegaly; bowel sounds are 

present in all four quadrants.


EXTREMITIES: No clubbing, cyanosis, or edema.


SKIN:  Normal; no rash; no jaundice.


CNS:  No focal deficits; alert and oriented times three.


 (Siobhan Logan)





Assessment and Plan


Plan


Assessment:


- Diarrhea, chronic since October- reports 5-9 BMs a day.


  Has tried OTC Imodium with mild relief. Denies any associated


  abdominal pain or unintentional weight loss. States some BRB


  in her stool when symptoms began in October but has not 


  noticed any recently. 


  Negative ova and parasites. 


  Enteric pathogens stool pending. 


  Denies family history of UC, Crohns, colon cancer.


  Has never had EGD or colonoscopy


- Hypertensive urgency- initially on nicardipine gtt - currently


  controlled on Diovan and Cardizem


- TIA- cleared by neurology for DC - on Heparin BID





5/9/18  s/p colonoscopy revealing circumferential diffuse colitis c/w 

ulcerative colitis.  d/w pt and family


   stool studies neg for enteric pathogetns, cryptosporidium, giardia.  + yeast 

species.  hemoccult neg





Plan:


- await bx


- asacol


- f/u with GI after d/c


- colonoscopy 1 year 





Pt has been seen and examined by myself and Dr. Holliday and this note is 

written on his behalf 


 (Siobhan Logan)


Physician Comments


Seen and examined by ARNP, suspect IBD. Await biopsies. Dc home on asacol 800mg 

tid. Gi fu upon dc. Thank you


 (Erwin Holliday MD)











Siobhan Logan May 9, 2018 13:07


Erwin Holliday MD May 9, 2018 14:32

## 2018-05-09 NOTE — HHI.DS
__________________________________________________





Discharge Summary


Admission Date


May 3, 2018 at 11:50


Admitting Diagnosis





hypertensive encephalopathy





(1) Hypertensive encephalopathy


ICD Code:  I67.4 - Hypertensive encephalopathy


Status:  Acute


(2) TIA (transient ischemic attack)


ICD Code:  G45.9 - Transient cerebral ischemic attack, unspecified


(3) Hypertensive urgency


ICD Code:  I16.0 - Hypertensive urgency


(4) Chronic diarrhea


ICD Code:  K52.9 - Noninfective gastroenteritis and colitis, unspecified


Brief History - From Admission


History of Present Illness


HPI


This is a 63-year-old female that presented to  the [place] ED with complaints 

of headache and some confusion.  Per report beginning last evening ,she had 

pain behind her left eye feeling like someone is pushing her eye out, constant, 

moderate severity associated with nausea but no vomiting.  For about an hour 

last evening she says she was confused and her words were not coming out right.

  Her daughter said she had slurred speech and she did not make any sense.  The 

patient's daughter directed her to report to the hospital last night , however 

she was reluctant secondary to lack of insurance.  This morning the patient's 

symptoms worsened, and she came to the ED.  She recently was diagnosed with 

hypertension and she was started on valsartan which she took last night at 6 

PM.  She also took Tylenol throughout the night for her pain.  In the ED , the 

patient was noted to be hypertensive nicardipine infusion was initiated and 

initial CT scan was performed which was negative.  Intermittently during the 

visit the patient began to have expressive aphasia and confusion, and a stroke 

alert was called.  Dr. Dyer evaluated the patient, repeat CT was performed, 

TPA was initially ordered however cancelled as the patient had resolution of 

symptoms returned to baseline.  Critical care medicine was consulted.





 History 


PFSH


Past Medical History


Hx Anticoagulant Therapy:  No


Asthma:  Yes


Diabetes:  No


Diminished Hearing:  No


GERD:  Yes


Hypertension:  Yes


:  2


Para:  2





Past Surgical History


Tonsillectomy:  Yes





Social History


Alcohol Use:  No


Tobacco Use:  No


Substance Use:  No





 Allergies-Medications 


Allergies-Medications


(Allergen,Severity, Reaction):  


Coded Allergies:  


     iodine (Unverified  Allergy, Severe, Hives, 8/15/17)


     potassium iodide (Unverified  Allergy, Severe, Hives, 8/15/17)


     povidone-iodine (Unverified  Allergy, Severe, Hives, 8/15/17)


     sodium iodide (Unverified  Allergy, Severe, Hives, 8/15/17)


     sodium iodide (Unverified  Allergy, Severe, Hives, 8/15/17)


Reported Meds & Prescriptions





Reported Meds & Active Scripts


Active


Erythromycin Opht 0.5% Oint (Erythromycin) 0.5 % Oint 1 Applic RIGHT EYE Q6HR 5 

Days


CBC/BMP:  


18 1011                                                                    

            18 1300





Significant Findings





Laboratory Tests








Test


  18


05:40 18


17:00


 


Urine Occult Blood  TRACE (NEG) 


 


Urine RBC  5 /hpf (0-3) 


 


Urine Mucus  FEW /lpf (OCC) 








PE at Discharge


GENERAL: This is a well-nourished, well-developed patient, in no apparent 

distress.


CARDIOVASCULAR: RRR,  no gallops, or rubs. 


RESPIRATORY: Fair air entry bilaterally. No W, R, or R


GASTROINTESTINAL: Abdomen soft, non-tender, nondistended.  Positive bowel sounds


MUSCULOSKELETAL: Extremities without clubbing, cyanosis, or edema.  Pedal 

pulses appreciated


NEUROLOGICAL: Awake and alert.  Moves all extremity.  Normal speech.no focal 

neurological deficit


Pt Condition on Discharge:  Good


Discharge Disposition:  Discharge Home


Discharge Instructions


DIET: Follow Instructions for:  Heart Healthy Diet


Activities you can perform:  Weight Bearing as Lucinda Zamora MD May 9, 2018 18:10

## 2023-12-04 NOTE — PD
HPI


Chief Complaint:  Hypertension


Time Seen by Provider:  09:33


Travel History


International Travel<30 days:  No


Contact w/Intl Traveler<30days:  No


Traveled to known affect area:  No





History of Present Illness


HPI


This is a 63-year-old female who presents to the emergency department with 

headache and confusion.  Last night she started to have pain behind her left 

eye feeling like someone is pushing her eye out, constant, moderate severity 

associated with nausea.  She denies any vomiting.  For about an hour last 

evening she says she was confused and her words were not coming out right.  Her 

daughter said she had slurred speech and she did not make any sense.  Her 

daughter told her to come to the hospital at that time but she was concerned 

because she did not have insurance but this morning her headache has persisted 

and she says she does not feel right so she came to the emergency department.  

She was told recently that she has high blood pressure and she was started on 

valsartan which she took last night at 6 PM.  She also took Tylenol throughout 

the night for her pain.





PFSH


Past Medical History


Hx Anticoagulant Therapy:  No


Asthma:  Yes


Diabetes:  No


Diminished Hearing:  No


GERD:  Yes


Hypertension:  Yes


:  2


Para:  2





Past Surgical History


Tonsillectomy:  Yes





Social History


Alcohol Use:  No


Tobacco Use:  No


Substance Use:  No





Allergies-Medications


(Allergen,Severity, Reaction):  


Coded Allergies:  


     iodine (Unverified  Allergy, Severe, Hives, 8/15/17)


     potassium iodide (Unverified  Allergy, Severe, Hives, 8/15/17)


     povidone-iodine (Unverified  Allergy, Severe, Hives, 8/15/17)


     sodium iodide (Unverified  Allergy, Severe, Hives, 8/15/17)


     sodium iodide (Unverified  Allergy, Severe, Hives, 8/15/17)


Reported Meds & Prescriptions





Reported Meds & Active Scripts


Active


Reported


Valsartan 320 Mg Tab 320 Mg PO DAILY








Review of Systems


Except as stated in HPI:  all other systems reviewed are Neg





Physical Exam


Narrative


GENERAL:Well appearing, no acute distress


SKIN: Focused skin assessment warm and dry.


HEAD: Atraumatic. Normocephalic. 


EYES: Pupils equal and round.  No injection or drainage. 


ENT:  Moist mucous membranes


NECK: Trachea midline. 


CARDIOVASCULAR: Regular rate and rhythm.  No murmur appreciated.


RESPIRATORY: Clear to auscultation. Breath sounds equal bilaterally. 


GASTROINTESTINAL: Abdomen soft, non-tender, nondistended. 


MUSCULOSKELETAL: No obvious deformities. 


NEUROLOGICAL: Awake and alert. No obvious cranial nerve deficits.   No 

dysarthria or aphasia.  No upper or lower extremity drift.  No upper extremity 

ataxia. 


PSYCHIATRIC: Appropriate mood and affect; insight and judgment normal.





Data


Data


Last Documented VS





Vital Signs








  Date Time  Temp Pulse Resp B/P (MAP) Pulse Ox O2 Delivery O2 Flow Rate FiO2


 


5/3/18 11:39  86 16 174/85 (114) 98 Nasal Cannula 2.00 


 


5/3/18 09:24 97.6       








Orders





 Orders


Electrocardiogram (5/3/18 09:47)


Complete Blood Count With Diff (5/3/18 09:47)


Comprehensive Metabolic Panel (5/3/18 09:47)


Troponin I (5/3/18 09:47)


Ct Brain W/O Iv Contrast(Rout) (5/3/18 09:47)


Blood Glucose (5/3/18 09:47)


Ecg Monitoring (5/3/18 09:47)


Iv Access Insert/Monitor (5/3/18 09:47)


Oximetry (5/3/18 09:47)


Sodium Chloride 0.9% Flush (Ns Flush) (5/3/18 10:00)


Proparacaine 0.5% Opth Soln (Alcaine 0.5 (5/3/18 10:00)


Labetalol Inj (Trandate Inj) (5/3/18 10:00)


Ct Brain W/O Iv Contrast(Rout) (5/3/18 )


^ Call Pharmacy (5/3/18 11:27)


Nih Stroke Scale - Nihss .ONCE (5/3/18 11:27)


Urinary Catheter Insert/Apply (5/3/18 11:27)


Anticoagulant Alert (5/3/18 11:27)


^ Post Infusion Restrictions (5/3/18 11:27)


^ Medication Alert (5/3/18 11:27)


Vital Signs (Adult) .As directed (5/3/18 11:27)


Notify Dr: Blood Pressure (5/3/18 11:27)


^ Medication Alert (5/3/18 11:27)


Alteplase Bolus (Activase Bolus) (5/3/18 11:30)


Alteplase Drip (Activase Drip) (5/3/18 11:30)


Sodium Chloride 0.9% Inj (Ns Inj) (5/3/18 11:30)


Nursing Information (Misc Nursing Inform (5/3/18 11:30)


Resp Oxygen Nc Stroke (5/3/18 )


Ct Brain W/O Iv Contrast(Rout) (18 )


Aspirin (Aspirin) (5/3/18 11:45)


Mra Brain W/O Contrast (Cow) (5/3/18 )


Lorazepam Inj (Ativan Inj) (5/3/18 11:45)


Admit Order (Ed Use Only) (5/3/18 11:48)


Mri Brain W/O Contrast (5/3/18 )





Labs





Laboratory Tests








Test


  5/3/18


09:58 5/3/18


10:30


 


White Blood Count 6.6 TH/MM3  


 


Red Blood Count 4.60 MIL/MM3  


 


Hemoglobin 12.8 GM/DL  


 


Hematocrit 39.0 %  


 


Mean Corpuscular Volume 84.9 FL  


 


Mean Corpuscular Hemoglobin 27.9 PG  


 


Mean Corpuscular Hemoglobin


Concent 32.9 % 


  


 


 


Red Cell Distribution Width 16.6 %  


 


Platelet Count 422 TH/MM3  


 


Mean Platelet Volume 7.4 FL  


 


Neutrophils (%) (Auto) 55.9 %  


 


Lymphocytes (%) (Auto) 31.0 %  


 


Monocytes (%) (Auto) 10.2 %  


 


Eosinophils (%) (Auto) 2.3 %  


 


Basophils (%) (Auto) 0.6 %  


 


Neutrophils # (Auto) 3.7 TH/MM3  


 


Lymphocytes # (Auto) 2.1 TH/MM3  


 


Monocytes # (Auto) 0.7 TH/MM3  


 


Eosinophils # (Auto) 0.2 TH/MM3  


 


Basophils # (Auto) 0.0 TH/MM3  


 


CBC Comment DIFF FINAL  


 


Differential Comment   


 


Blood Urea Nitrogen  8 MG/DL 


 


Creatinine  1.08 MG/DL 


 


Random Glucose  117 MG/DL 


 


Total Protein  8.2 GM/DL 


 


Albumin  3.1 GM/DL 


 


Calcium Level  8.7 MG/DL 


 


Alkaline Phosphatase  72 U/L 


 


Aspartate Amino Transf


(AST/SGOT) 


  13 U/L 


 


 


Alanine Aminotransferase


(ALT/SGPT) 


  10 U/L 


 


 


Total Bilirubin  0.3 MG/DL 


 


Sodium Level  142 MEQ/L 


 


Potassium Level  3.1 MEQ/L 


 


Chloride Level  108 MEQ/L 


 


Carbon Dioxide Level  26.5 MEQ/L 


 


Anion Gap  8 MEQ/L 


 


Estimat Glomerular Filtration


Rate 


  62 ML/MIN 


 


 


Troponin I


  


  LESS THAN 0.02


NG/ML











MDM


Medical Decision Making


Medical Screen Exam Complete:  Yes


Emergency Medical Condition:  Yes


Interpretation(s)


Afebrile, no tachycardia, hypertensive


No leukocytosis


Mild hypokalemia


CT head: No acute process


Differential Diagnosis


Ischemic stroke, hemorrhagic stroke, hypertensive encephalopathy, seizure, TIA


Narrative Course


This is a 63-year-old female who presents to the emergency department with an 

episode of difficulty speaking that occurred last night in the setting of 

hypertension.  In the emergency department she was given 10 mg of IV labetalol 

and an IV was established and labs were obtained.  While she was in the 

emergency department she started to have more difficulty speaking.  A stroke 

alert was called and patient was transported to CT a second time to ensure she 

did not have a spontaneous intracranial hemorrhage.  The family was consented 

for TPA and nicardipine was initiated to lower the patient's blood pressure.  

During this time the patient's symptoms significantly improved and subsided so 

TPA was deferred.  Patient was evaluated by Dr. Dyer the neurologist at the 

bedside.  Patient was admitted to the intensive care unit for close monitoring 

of her blood pressure.  I suspect her symptoms reflected hypertensive 

encephalopathy but she will be taken to MRI for evaluation for stroke.





Physician Communication


Physician Communication


Discussed with Dr. Dyer and Dr. Taylor





Diagnosis





 Primary Impression:  


 Hypertensive encephalopathy





Admitting Information


Admitting Physician Requests:  Admit











Susi Cornell MD May 3, 2018 09:53 RN reviewed mother baby care guided.  RN reviewed instructions on how to take off dressing. RN reviewed with pt signs and symptoms of postpartum preeclampsia. No questions at this time. RN reviewed to follow up in 3 days for a postpartum blood pressure check. Pt verbalized understanding.